# Patient Record
Sex: FEMALE | Race: WHITE | NOT HISPANIC OR LATINO | URBAN - METROPOLITAN AREA
[De-identification: names, ages, dates, MRNs, and addresses within clinical notes are randomized per-mention and may not be internally consistent; named-entity substitution may affect disease eponyms.]

---

## 2023-07-12 ENCOUNTER — PATIENT OUTREACH (OUTPATIENT)
Dept: ADMINISTRATIVE | Facility: HOSPITAL | Age: 53
End: 2023-07-12
Payer: COMMERCIAL

## 2023-07-12 NOTE — PROGRESS NOTES
Health Maintenance Due   Topic Date Due    Hepatitis C Screening  Never done    Cervical Cancer Screening  Never done    Lipid Panel  Never done    HIV Screening  Never done    TETANUS VACCINE  Never done    Mammogram  Never done    Colorectal Cancer Screening  Never done    Shingles Vaccine (1 of 2) Never done    COVID-19 Vaccine (2 - Pfizer series) 09/14/2021     Chart reviewed. Triggered LINKS. Updated Care Everywhere. Pt has new patient appointment upcoming.     Izaiah Will CMA  Population Health Care Coordinator  Primary Care Team

## 2023-07-17 ENCOUNTER — OFFICE VISIT (OUTPATIENT)
Dept: INTERNAL MEDICINE | Facility: CLINIC | Age: 53
End: 2023-07-17
Payer: COMMERCIAL

## 2023-07-17 ENCOUNTER — LAB VISIT (OUTPATIENT)
Dept: LAB | Facility: HOSPITAL | Age: 53
End: 2023-07-17
Attending: INTERNAL MEDICINE
Payer: COMMERCIAL

## 2023-07-17 VITALS
WEIGHT: 191.13 LBS | HEIGHT: 63 IN | SYSTOLIC BLOOD PRESSURE: 124 MMHG | HEART RATE: 77 BPM | OXYGEN SATURATION: 98 % | DIASTOLIC BLOOD PRESSURE: 74 MMHG | BODY MASS INDEX: 33.87 KG/M2

## 2023-07-17 DIAGNOSIS — L30.9 ECZEMA, UNSPECIFIED TYPE: ICD-10-CM

## 2023-07-17 DIAGNOSIS — Z11.4 ENCOUNTER FOR SCREENING FOR HIV: ICD-10-CM

## 2023-07-17 DIAGNOSIS — E11.65 TYPE 2 DIABETES MELLITUS WITH HYPERGLYCEMIA, WITHOUT LONG-TERM CURRENT USE OF INSULIN: ICD-10-CM

## 2023-07-17 DIAGNOSIS — I10 PRIMARY HYPERTENSION: ICD-10-CM

## 2023-07-17 DIAGNOSIS — Z76.89 ENCOUNTER TO ESTABLISH CARE: Primary | ICD-10-CM

## 2023-07-17 DIAGNOSIS — Z12.31 ENCOUNTER FOR SCREENING MAMMOGRAM FOR MALIGNANT NEOPLASM OF BREAST: ICD-10-CM

## 2023-07-17 DIAGNOSIS — Z12.11 SCREENING FOR COLON CANCER: ICD-10-CM

## 2023-07-17 DIAGNOSIS — Z11.59 NEED FOR HEPATITIS C SCREENING TEST: ICD-10-CM

## 2023-07-17 DIAGNOSIS — Z80.0 FAMILY HISTORY OF COLON CANCER IN FATHER: ICD-10-CM

## 2023-07-17 DIAGNOSIS — F41.9 ANXIETY: ICD-10-CM

## 2023-07-17 PROBLEM — E11.9 TYPE 2 DIABETES MELLITUS: Status: ACTIVE | Noted: 2023-07-17

## 2023-07-17 PROBLEM — A63.0 CONDYLOMA ACUMINATUM OF VULVA: Status: ACTIVE | Noted: 2021-11-05

## 2023-07-17 PROBLEM — E78.5 HYPERLIPIDEMIA: Status: ACTIVE | Noted: 2023-07-17

## 2023-07-17 PROBLEM — R87.612 LOW GRADE SQUAMOUS INTRAEPITHELIAL LESION (LGSIL) ON CERVICAL PAP SMEAR: Status: ACTIVE | Noted: 2021-11-05

## 2023-07-17 LAB
ALBUMIN/CREAT UR: NORMAL UG/MG (ref 0–30)
CREAT UR-MCNC: 27 MG/DL (ref 15–325)
MICROALBUMIN UR DL<=1MG/L-MCNC: <5 UG/ML

## 2023-07-17 PROCEDURE — 99386 PREV VISIT NEW AGE 40-64: CPT | Mod: S$GLB,,, | Performed by: INTERNAL MEDICINE

## 2023-07-17 PROCEDURE — 3074F SYST BP LT 130 MM HG: CPT | Mod: CPTII,S$GLB,, | Performed by: INTERNAL MEDICINE

## 2023-07-17 PROCEDURE — 1159F PR MEDICATION LIST DOCUMENTED IN MEDICAL RECORD: ICD-10-PCS | Mod: CPTII,S$GLB,, | Performed by: INTERNAL MEDICINE

## 2023-07-17 PROCEDURE — 3008F BODY MASS INDEX DOCD: CPT | Mod: CPTII,S$GLB,, | Performed by: INTERNAL MEDICINE

## 2023-07-17 PROCEDURE — 4010F PR ACE/ARB THEARPY RXD/TAKEN: ICD-10-PCS | Mod: CPTII,S$GLB,, | Performed by: INTERNAL MEDICINE

## 2023-07-17 PROCEDURE — 82570 ASSAY OF URINE CREATININE: CPT | Performed by: INTERNAL MEDICINE

## 2023-07-17 PROCEDURE — 3078F DIAST BP <80 MM HG: CPT | Mod: CPTII,S$GLB,, | Performed by: INTERNAL MEDICINE

## 2023-07-17 PROCEDURE — 1160F RVW MEDS BY RX/DR IN RCRD: CPT | Mod: CPTII,S$GLB,, | Performed by: INTERNAL MEDICINE

## 2023-07-17 PROCEDURE — 3008F PR BODY MASS INDEX (BMI) DOCUMENTED: ICD-10-PCS | Mod: CPTII,S$GLB,, | Performed by: INTERNAL MEDICINE

## 2023-07-17 PROCEDURE — 99386 PR PREVENTIVE VISIT,NEW,40-64: ICD-10-PCS | Mod: S$GLB,,, | Performed by: INTERNAL MEDICINE

## 2023-07-17 PROCEDURE — 4010F ACE/ARB THERAPY RXD/TAKEN: CPT | Mod: CPTII,S$GLB,, | Performed by: INTERNAL MEDICINE

## 2023-07-17 PROCEDURE — 99999 PR PBB SHADOW E&M-EST. PATIENT-LVL IV: CPT | Mod: PBBFAC,,, | Performed by: INTERNAL MEDICINE

## 2023-07-17 PROCEDURE — 3074F PR MOST RECENT SYSTOLIC BLOOD PRESSURE < 130 MM HG: ICD-10-PCS | Mod: CPTII,S$GLB,, | Performed by: INTERNAL MEDICINE

## 2023-07-17 PROCEDURE — 1160F PR REVIEW ALL MEDS BY PRESCRIBER/CLIN PHARMACIST DOCUMENTED: ICD-10-PCS | Mod: CPTII,S$GLB,, | Performed by: INTERNAL MEDICINE

## 2023-07-17 PROCEDURE — 3078F PR MOST RECENT DIASTOLIC BLOOD PRESSURE < 80 MM HG: ICD-10-PCS | Mod: CPTII,S$GLB,, | Performed by: INTERNAL MEDICINE

## 2023-07-17 PROCEDURE — 1159F MED LIST DOCD IN RCRD: CPT | Mod: CPTII,S$GLB,, | Performed by: INTERNAL MEDICINE

## 2023-07-17 PROCEDURE — 99999 PR PBB SHADOW E&M-EST. PATIENT-LVL IV: ICD-10-PCS | Mod: PBBFAC,,, | Performed by: INTERNAL MEDICINE

## 2023-07-17 RX ORDER — TRIAMCINOLONE ACETONIDE 1 MG/G
CREAM TOPICAL 2 TIMES DAILY
Qty: 28.4 G | Refills: 2 | Status: SHIPPED | OUTPATIENT
Start: 2023-07-17

## 2023-07-17 RX ORDER — CITALOPRAM 10 MG/1
10 TABLET ORAL EVERY MORNING
Qty: 90 TABLET | Refills: 3 | Status: SHIPPED | OUTPATIENT
Start: 2023-07-17

## 2023-07-17 RX ORDER — DAPAGLIFLOZIN 10 MG/1
10 TABLET, FILM COATED ORAL DAILY
Qty: 90 TABLET | Refills: 3 | Status: SHIPPED | OUTPATIENT
Start: 2023-07-17

## 2023-07-17 RX ORDER — DAPAGLIFLOZIN 10 MG/1
10 TABLET, FILM COATED ORAL DAILY
COMMUNITY
End: 2023-07-17 | Stop reason: SDUPTHER

## 2023-07-17 RX ORDER — METOPROLOL SUCCINATE 100 MG/1
100 TABLET, EXTENDED RELEASE ORAL DAILY
COMMUNITY
End: 2023-07-17 | Stop reason: SDUPTHER

## 2023-07-17 RX ORDER — CITALOPRAM 10 MG/1
10 TABLET ORAL EVERY MORNING
COMMUNITY
Start: 2023-06-19 | End: 2023-07-17 | Stop reason: SDUPTHER

## 2023-07-17 RX ORDER — METFORMIN HYDROCHLORIDE 500 MG/1
500 TABLET ORAL 2 TIMES DAILY WITH MEALS
COMMUNITY
End: 2023-07-17 | Stop reason: SDUPTHER

## 2023-07-17 RX ORDER — LOSARTAN POTASSIUM 100 MG/1
100 TABLET ORAL DAILY
COMMUNITY
End: 2023-07-17 | Stop reason: SDUPTHER

## 2023-07-17 RX ORDER — LOSARTAN POTASSIUM 100 MG/1
100 TABLET ORAL DAILY
Qty: 90 TABLET | Refills: 3 | Status: SHIPPED | OUTPATIENT
Start: 2023-07-17

## 2023-07-17 RX ORDER — METFORMIN HYDROCHLORIDE 500 MG/1
TABLET ORAL
Qty: 270 TABLET | Refills: 3 | Status: SHIPPED | OUTPATIENT
Start: 2023-07-17

## 2023-07-17 RX ORDER — CITALOPRAM HYDROBROMIDE 10 MG/5ML
10 SOLUTION ORAL DAILY
COMMUNITY
End: 2023-07-17 | Stop reason: CLARIF

## 2023-07-17 RX ORDER — METOPROLOL SUCCINATE 100 MG/1
100 TABLET, EXTENDED RELEASE ORAL DAILY
Qty: 90 TABLET | Refills: 3 | Status: SHIPPED | OUTPATIENT
Start: 2023-07-17

## 2023-07-17 NOTE — PATIENT INSTRUCTIONS
Schedule fasting labwork  Urine sample today  Schedule mammogram  Schedule optometry appointment for diabetic eye exam.    Schedule appointment with gynecology (Dr. Duffy) at your earliest convenience    Colonoscopy - a colonoscopy has been ordered for you. In order to schedule this appointment, please call [phone].     Return to in 3 months or sooner if needed.

## 2023-07-17 NOTE — PROGRESS NOTES
"Subjective:       Patient ID: Waleska Weir is a 52 y.o. female.    Chief Complaint: Establish Care      HPI  Waleska Weir is a 52 y.o. year old female with HTN, anxiety, abnormal pap with hx of condyloma (follows with gyn Dr. Pritchard), t2dm (reportedly controlled on medications) presents for establishment of care and medication refill. Previous PCP Dr. Watson.    Reportedly was previously on trulicity, was not consistent with taking it, was taken off this medication.     Review of Systems   Constitutional:  Negative for activity change, appetite change, fatigue, fever and unexpected weight change.   HENT:  Negative for congestion, hearing loss, postnasal drip, sneezing, sore throat, trouble swallowing and voice change.    Eyes:  Negative for pain and discharge.   Respiratory:  Negative for cough, choking, chest tightness, shortness of breath and wheezing.    Cardiovascular:  Negative for chest pain, palpitations and leg swelling.   Gastrointestinal:  Negative for abdominal distention, abdominal pain, blood in stool, constipation, diarrhea, nausea and vomiting.   Endocrine: Negative for polydipsia and polyuria.   Genitourinary:  Negative for difficulty urinating and flank pain.   Musculoskeletal:  Negative for arthralgias, back pain, joint swelling, myalgias and neck pain.   Skin:  Negative for rash.   Neurological:  Negative for dizziness, tremors, seizures, weakness, numbness and headaches.   Psychiatric/Behavioral:  Negative for agitation. The patient is not nervous/anxious.        No past medical history on file.     Prior to Admission medications    Not on File        Past medical history, surgical history, and family medical history reviewed and updated as appropriate.    Medications and allergies reviewed.     Objective:          Vitals:    07/17/23 0759   BP: 124/74   BP Location: Right arm   Patient Position: Sitting   Pulse: 77   SpO2: 98%   Weight: 86.7 kg (191 lb 2.2 oz)   Height: 5' 3" (1.6 m) "     Body mass index is 33.86 kg/m².  Physical Exam  Constitutional:       Appearance: She is well-developed.   HENT:      Head: Normocephalic and atraumatic.   Eyes:      Extraocular Movements: Extraocular movements intact.   Cardiovascular:      Rate and Rhythm: Normal rate and regular rhythm.      Heart sounds: Normal heart sounds.   Pulmonary:      Effort: Pulmonary effort is normal. No respiratory distress.      Breath sounds: Normal breath sounds. No wheezing.   Abdominal:      General: Bowel sounds are normal. There is no distension.      Palpations: Abdomen is soft.      Tenderness: There is no abdominal tenderness.   Musculoskeletal:         General: No tenderness. Normal range of motion.      Cervical back: Normal range of motion.   Skin:     General: Skin is warm and dry.   Neurological:      Mental Status: She is alert and oriented to person, place, and time.      Cranial Nerves: No cranial nerve deficit.      Deep Tendon Reflexes: Reflexes are normal and symmetric.       No results found for: WBC, HGB, HCT, PLT, CHOL, TRIG, HDL, LDLDIRECT, ALT, AST, NA, K, CL, CREATININE, BUN, CO2, TSH, PSA, INR, GLUF, HGBA1C, MICROALBUR    Assessment:       1. Encounter to establish care    2. Primary hypertension    3. Type 2 diabetes mellitus with hyperglycemia, without long-term current use of insulin    4. Anxiety    5. Eczema, unspecified type    6. Screening for colon cancer    7. Family history of colon cancer in father    8. Encounter for screening mammogram for malignant neoplasm of breast    9. Encounter for screening for HIV    10. Need for hepatitis C screening test          Plan:     Waleska was seen today for establish care.    Diagnoses and all orders for this visit:    Encounter to establish care    Primary hypertension  Comments:  on losartan 100 mg, metoprolol succinate 100 mg  Orders:  -     CBC Auto Differential; Future  -     Comprehensive Metabolic Panel; Future  -     TSH; Future  -     Lipid  Panel; Future  -     losartan (COZAAR) 100 MG tablet; Take 1 tablet (100 mg total) by mouth once daily.  -     metoprolol succinate (TOPROL-XL) 100 MG 24 hr tablet; Take 1 tablet (100 mg total) by mouth once daily.    Type 2 diabetes mellitus with hyperglycemia, without long-term current use of insulin  Comments:  last a1c unknown, on metformin 500 TID and dapagliflozin 10 mg  Orders:  -     Hemoglobin A1C; Future  -     Lipid Panel; Future  -     Microalbumin/Creatinine Ratio, Urine; Future  -     Ambulatory referral/consult to Optometry; Future  -     dapagliflozin propanediol (FARXIGA) 10 mg tablet; Take 1 tablet (10 mg total) by mouth once daily.  -     metFORMIN (GLUCOPHAGE) 500 MG tablet; Take 2 tablets (1,000 mg total) by mouth daily with breakfast AND 1 tablet (500 mg total) daily with dinner or evening meal.    Anxiety  Comments:  medications reviewed, prescribed citalopram 10 mg daily  Orders:  -     citalopram (CELEXA) 10 MG tablet; Take 1 tablet (10 mg total) by mouth every morning.    Eczema, unspecified type  -     triamcinolone acetonide 0.1% (KENALOG) 0.1 % cream; Apply topically 2 (two) times daily.    Screening for colon cancer  -     Ambulatory referral/consult to Endo Procedure ; Future    Family history of colon cancer in father  -     Ambulatory referral/consult to Endo Procedure ; Future    Encounter for screening mammogram for malignant neoplasm of breast  -     Mammo Digital Screening Bilat; Future    Encounter for screening for HIV  -     HIV 1/2 Ag/Ab (4th Gen); Future    Need for hepatitis C screening test  -     HEPATITIS C ANTIBODY; Future    Benign physical examination, no issues identified. Will obtain routine labwork and age appropriate health screenings.     Health maintenance reviewed with patient.     Follow up in about 3 months (around 10/17/2023).    Yifan Peña MD  Internal Medicine / Primary Care  [facility]  7/17/2023

## 2023-07-18 ENCOUNTER — TELEPHONE (OUTPATIENT)
Dept: ENDOSCOPY | Facility: HOSPITAL | Age: 53
End: 2023-07-18
Payer: COMMERCIAL

## 2023-07-18 NOTE — TELEPHONE ENCOUNTER
Contacted the patient to schedule an endoscopy procedure(s) colonoscopy. The patient did not answer the call and left a voice message requesting a call back.

## 2023-07-25 ENCOUNTER — TELEPHONE (OUTPATIENT)
Dept: ENDOSCOPY | Facility: HOSPITAL | Age: 53
End: 2023-07-25
Payer: COMMERCIAL

## 2023-07-26 ENCOUNTER — TELEPHONE (OUTPATIENT)
Dept: ENDOSCOPY | Facility: HOSPITAL | Age: 53
End: 2023-07-26
Payer: COMMERCIAL

## 2024-02-20 ENCOUNTER — PATIENT OUTREACH (OUTPATIENT)
Dept: ADMINISTRATIVE | Facility: HOSPITAL | Age: 54
End: 2024-02-20
Payer: COMMERCIAL

## 2024-02-20 NOTE — PROGRESS NOTES
Health Maintenance Due   Topic Date Due    Hepatitis C Screening  Never done    Cervical Cancer Screening  Never done    HIV Screening  Never done    Mammogram  Never done    Colorectal Cancer Screening  Never done    Shingles Vaccine (1 of 2) Never done    Influenza Vaccine (1) Never done    COVID-19 Vaccine (2 - 2023-24 season) 09/01/2023      Chart reviewed. Triggered LINKS. Updated Care Everywhere.     Izaiah Will CMA  Population Health Care Coordinator  Primary Care Team

## 2024-03-22 ENCOUNTER — PATIENT OUTREACH (OUTPATIENT)
Dept: ADMINISTRATIVE | Facility: HOSPITAL | Age: 54
End: 2024-03-22
Payer: COMMERCIAL

## 2024-06-24 DIAGNOSIS — I10 PRIMARY HYPERTENSION: ICD-10-CM

## 2024-06-24 DIAGNOSIS — E11.65 TYPE 2 DIABETES MELLITUS WITH HYPERGLYCEMIA, WITHOUT LONG-TERM CURRENT USE OF INSULIN: ICD-10-CM

## 2024-06-24 RX ORDER — LOSARTAN POTASSIUM 100 MG/1
100 TABLET ORAL DAILY
Qty: 90 TABLET | Refills: 3 | Status: SHIPPED | OUTPATIENT
Start: 2024-06-24

## 2024-06-24 RX ORDER — METFORMIN HYDROCHLORIDE 500 MG/1
TABLET ORAL
Qty: 270 TABLET | Refills: 3 | OUTPATIENT
Start: 2024-06-24

## 2024-06-24 RX ORDER — METFORMIN HYDROCHLORIDE 500 MG/1
TABLET ORAL
Qty: 270 TABLET | Refills: 0 | Status: SHIPPED | OUTPATIENT
Start: 2024-06-24

## 2024-06-24 NOTE — TELEPHONE ENCOUNTER
Refill Routing Note   Medication(s) are not appropriate for processing by [facility] for the following reason(s):             [facility] action(s):  Defer  Quick Discontinue   Requires labs : Yes      Medication Therapy Plan: metformin rx request sent to PCP today      Appointments  past 12m or future 3m with PCP    Date Provider   Last Visit   7/17/2023 Yifan Peña MD   Next Visit   6/24/2024 Yifan Peña MD   ED visits in past 90 days: 0        Note composed:1:12 PM 06/24/2024

## 2024-06-24 NOTE — TELEPHONE ENCOUNTER
No care due was identified.  [facility] Embedded Care Due Messages. Reference number: [id number].   6/24/2024 10:57:49 AM CDT

## 2024-06-24 NOTE — TELEPHONE ENCOUNTER
----- Message from Chapin Block sent at 6/24/2024  8:24 AM CDT -----  Regarding: Refill Request  Contact: pt [phone]  Requesting an RX refill or new RX.    Is this a refill or new RX: refill    RX name and strength (copy/paste from chart):  metFORMIN (GLUCOPHAGE) 500 MG tablet    Is this a 30 day or 90 day RX:     Pharmacy name and phone # (copy/paste from chart):    [facility] - [facility] - [address] [place] [address], [facility] [address]  [address] [place] [address] [address] [place] [address]  Phone: [phone] Fax: [phone]

## 2024-06-24 NOTE — TELEPHONE ENCOUNTER
Care Due:                  Date            Visit Type   Department     Provider  --------------------------------------------------------------------------------                                NP -                              PRIMARY      [facility] INTERNAL  Last Visit: 07-      CARE ([facility])   MEDICINE       Yifan Peña  Next Visit: None Scheduled  None         None Found                                                            Last  Test          Frequency    Reason                     Performed    Due Date  --------------------------------------------------------------------------------    CMP.........  12 months..  dapagliflozin, losartan,   Not Found    Overdue                             metFORMIN................    HBA1C.......  6 months...  dapagliflozin, metFORMIN.  Not Found    Overdue    [employer] Embedded Care Due Messages. Reference number: [id number].   6/24/2024 10:27:57 AM CDT

## 2024-06-24 NOTE — TELEPHONE ENCOUNTER
Refill Routing Note   Medication(s) are not appropriate for processing by [facility] for the following reason(s):        Required labs outdated    [facility] action(s):  Defer               Appointments  past 12m or future 3m with PCP    Date Provider   Last Visit   7/17/2023 Yifan Peña MD   Next Visit   6/28/2024 Yifan Peña MD   ED visits in past 90 days: 0        Note composed:11:01 AM 06/24/2024

## 2024-06-25 ENCOUNTER — TELEPHONE (OUTPATIENT)
Dept: INTERNAL MEDICINE | Facility: CLINIC | Age: 54
End: 2024-06-25
Payer: COMMERCIAL

## 2024-06-25 NOTE — TELEPHONE ENCOUNTER
----- Message from Erica Wilcox sent at 6/24/2024  1:24 PM CDT -----  Contact: Pt [phone]  Would like to receive medical advice.    Would they like a call back or a response via [facility]:  Call Back  Additional information:      The pt is calling to discuss why her metFORMIN (GLUCOPHAGE) 500 MG tablet was denied when she requested a refill. It would have been for just enough to last her till the appt on Friday.

## 2024-07-23 DIAGNOSIS — I10 PRIMARY HYPERTENSION: ICD-10-CM

## 2024-07-23 DIAGNOSIS — E11.65 TYPE 2 DIABETES MELLITUS WITH HYPERGLYCEMIA, WITHOUT LONG-TERM CURRENT USE OF INSULIN: ICD-10-CM

## 2024-07-23 DIAGNOSIS — F41.9 ANXIETY: ICD-10-CM

## 2024-07-23 RX ORDER — CITALOPRAM 10 MG/1
10 TABLET ORAL EVERY MORNING
Qty: 90 TABLET | Refills: 0 | Status: SHIPPED | OUTPATIENT
Start: 2024-07-23

## 2024-07-23 NOTE — TELEPHONE ENCOUNTER
Care Due:                  Date            Visit Type   Department     Provider  --------------------------------------------------------------------------------                                NP -                              PRIMARY      [facility] INTERNAL  Last Visit: 07-      [facility] ([facility])   MEDICINE       Yifan Peña  Next Visit: None Scheduled  None         None Found                                                            Last  Test          Frequency    Reason                     Performed    Due Date  --------------------------------------------------------------------------------    Office Visit  15 months..  citalopram,                07-   10-                             dapagliflozin, losartan,                             metFORMIN, metoprolol....    [employer] Embedded Care Due Messages. Reference number: [id number].   7/23/2024 10:28:35 AM CDT

## 2024-07-23 NOTE — TELEPHONE ENCOUNTER
Refill Routing Note   Medication(s) are not appropriate for processing by [facility] for the following reason(s):        Required labs outdated  Required vitals outdated    [facility] action(s):  Defer  Approve   Requires appointment : Yes             Appointments  past 12m or future 3m with PCP    Date Provider   Last Visit   7/17/2023 Yifan Peña MD   Next Visit   Visit date not found Yifan Peña MD   ED visits in past 90 days: 0        Note composed:4:22 PM 07/23/2024

## 2024-07-24 RX ORDER — METOPROLOL SUCCINATE 100 MG/1
100 TABLET, EXTENDED RELEASE ORAL DAILY
Qty: 90 TABLET | Refills: 0 | Status: SHIPPED | OUTPATIENT
Start: 2024-07-24

## 2024-07-24 RX ORDER — DAPAGLIFLOZIN 10 MG/1
10 TABLET, FILM COATED ORAL DAILY
Qty: 90 TABLET | Refills: 0 | Status: SHIPPED | OUTPATIENT
Start: 2024-07-24

## 2024-08-07 ENCOUNTER — PATIENT OUTREACH (OUTPATIENT)
Dept: ADMINISTRATIVE | Facility: HOSPITAL | Age: 54
End: 2024-08-07
Payer: COMMERCIAL

## 2024-09-19 ENCOUNTER — OFFICE VISIT (OUTPATIENT)
Dept: INTERNAL MEDICINE | Facility: CLINIC | Age: 54
End: 2024-09-19
Payer: COMMERCIAL

## 2024-09-19 ENCOUNTER — LAB VISIT (OUTPATIENT)
Dept: LAB | Facility: HOSPITAL | Age: 54
End: 2024-09-19
Payer: COMMERCIAL

## 2024-09-19 VITALS
DIASTOLIC BLOOD PRESSURE: 70 MMHG | WEIGHT: 189.81 LBS | SYSTOLIC BLOOD PRESSURE: 130 MMHG | HEART RATE: 86 BPM | BODY MASS INDEX: 33.63 KG/M2 | OXYGEN SATURATION: 95 % | HEIGHT: 63 IN

## 2024-09-19 DIAGNOSIS — E11.69 TYPE 2 DIABETES MELLITUS WITH OTHER SPECIFIED COMPLICATION, WITHOUT LONG-TERM CURRENT USE OF INSULIN: ICD-10-CM

## 2024-09-19 DIAGNOSIS — I10 HYPERTENSION, UNSPECIFIED TYPE: ICD-10-CM

## 2024-09-19 DIAGNOSIS — E11.65 TYPE 2 DIABETES MELLITUS WITH HYPERGLYCEMIA, WITHOUT LONG-TERM CURRENT USE OF INSULIN: ICD-10-CM

## 2024-09-19 DIAGNOSIS — Z12.4 CERVICAL CANCER SCREENING: ICD-10-CM

## 2024-09-19 DIAGNOSIS — R06.2 WHEEZING: ICD-10-CM

## 2024-09-19 DIAGNOSIS — R21 RASH: ICD-10-CM

## 2024-09-19 DIAGNOSIS — Z00.00 ANNUAL PHYSICAL EXAM: ICD-10-CM

## 2024-09-19 DIAGNOSIS — Z12.31 ENCOUNTER FOR SCREENING MAMMOGRAM FOR BREAST CANCER: ICD-10-CM

## 2024-09-19 DIAGNOSIS — E78.5 HYPERLIPIDEMIA, UNSPECIFIED HYPERLIPIDEMIA TYPE: ICD-10-CM

## 2024-09-19 DIAGNOSIS — Z00.00 ANNUAL PHYSICAL EXAM: Primary | ICD-10-CM

## 2024-09-19 DIAGNOSIS — Z12.11 SCREENING FOR COLON CANCER: ICD-10-CM

## 2024-09-19 LAB
ALBUMIN/CREAT UR: NORMAL UG/MG (ref 0–30)
CREAT UR-MCNC: 41 MG/DL (ref 15–325)
MICROALBUMIN UR DL<=1MG/L-MCNC: <5 UG/ML

## 2024-09-19 PROCEDURE — 3066F NEPHROPATHY DOC TX: CPT | Mod: CPTII,S$GLB,, | Performed by: NURSE PRACTITIONER

## 2024-09-19 PROCEDURE — 1159F MED LIST DOCD IN RCRD: CPT | Mod: CPTII,S$GLB,, | Performed by: NURSE PRACTITIONER

## 2024-09-19 PROCEDURE — 3061F NEG MICROALBUMINURIA REV: CPT | Mod: CPTII,S$GLB,, | Performed by: NURSE PRACTITIONER

## 2024-09-19 PROCEDURE — 99999 PR PBB SHADOW E&M-EST. PATIENT-LVL IV: CPT | Mod: PBBFAC,,, | Performed by: NURSE PRACTITIONER

## 2024-09-19 PROCEDURE — 99396 PREV VISIT EST AGE 40-64: CPT | Mod: S$GLB,,, | Performed by: NURSE PRACTITIONER

## 2024-09-19 PROCEDURE — 3078F DIAST BP <80 MM HG: CPT | Mod: CPTII,S$GLB,, | Performed by: NURSE PRACTITIONER

## 2024-09-19 PROCEDURE — 3008F BODY MASS INDEX DOCD: CPT | Mod: CPTII,S$GLB,, | Performed by: NURSE PRACTITIONER

## 2024-09-19 PROCEDURE — 4010F ACE/ARB THERAPY RXD/TAKEN: CPT | Mod: CPTII,S$GLB,, | Performed by: NURSE PRACTITIONER

## 2024-09-19 PROCEDURE — 82570 ASSAY OF URINE CREATININE: CPT | Performed by: NURSE PRACTITIONER

## 2024-09-19 PROCEDURE — 3046F HEMOGLOBIN A1C LEVEL >9.0%: CPT | Mod: CPTII,S$GLB,, | Performed by: NURSE PRACTITIONER

## 2024-09-19 PROCEDURE — 3075F SYST BP GE 130 - 139MM HG: CPT | Mod: CPTII,S$GLB,, | Performed by: NURSE PRACTITIONER

## 2024-09-19 PROCEDURE — 1160F RVW MEDS BY RX/DR IN RCRD: CPT | Mod: CPTII,S$GLB,, | Performed by: NURSE PRACTITIONER

## 2024-09-19 PROCEDURE — 82043 UR ALBUMIN QUANTITATIVE: CPT | Performed by: NURSE PRACTITIONER

## 2024-09-19 RX ORDER — METFORMIN HYDROCHLORIDE 500 MG/1
TABLET ORAL
Qty: 90 TABLET | Refills: 0 | Status: SHIPPED | OUTPATIENT
Start: 2024-09-19 | End: 2024-09-20

## 2024-09-19 RX ORDER — HYDROCORTISONE 25 MG/G
CREAM TOPICAL 2 TIMES DAILY PRN
Qty: 28 G | Refills: 0 | Status: SHIPPED | OUTPATIENT
Start: 2024-09-19 | End: 2024-09-29

## 2024-09-19 NOTE — PROGRESS NOTES
INTERNAL MEDICINE PROGRESS/URGENT CARE NOTE    CHIEF COMPLAINT     Chief Complaint   Patient presents with    Annual Exam       HPI     Waleska Weir is a 54 y.o. female who presents for a follow up visit today.    Has been lost to follow up for over a year.  Did not get her labs done that PCP ordered.    She has a history of diabetes.  She is on metformin a 1000 mg in the morning and 500 mg at night.  She is also on 10 mg of Farxiga.  Sometimes checks her blood sugars which ranged between 150 and 170 fasting in the morning.  Needs an eye exam. Does have some dietary indiscretions.     History of hypertension.  Well-controlled on losartan 100 mg and metoprolol 100 mg.    Has been on Celexa 10 mg for her mood and tolerates this well.    She is overdue for all of her preventative health screens.  She has never had a colonoscopy.  She is amenable to do one.     Her only complaint is that she has had this intermittent red itchy rash to her face for the past week.  Has tried antihistamines.  No other lesions.  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  She does not smoke.    Patient Active Problem List   Diagnosis    Condyloma acuminatum of vulva    Hyperlipidemia    Hypertension    Low grade squamous intraepithelial lesion (LGSIL) on cervical Pap smear    Type 2 diabetes mellitus        Past Medical History:  No past medical history on file.     Past Surgical History:  No past surgical history on file.     Allergies:  Review of patient's allergies indicates:  No Known Allergies    Home Medications:    Current Outpatient Medications:     citalopram (CELEXA) 10 MG tablet, Take 1 tablet (10 mg total) by mouth every morning., Disp: 90 tablet, Rfl: 0    FARXIGA 10 mg tablet, Take 1 tablet (10 mg total) by mouth once daily., Disp: 90 tablet, Rfl: 0    losartan (COZAAR) 100 MG tablet, Take 1 tablet (100 mg total) by mouth once daily., Disp: 90 tablet, Rfl: 3     "metoprolol succinate (TOPROL-XL) 100 MG 24 hr tablet, Take 1 tablet (100 mg total) by mouth once daily., Disp: 90 tablet, Rfl: 0    triamcinolone acetonide 0.1% (KENALOG) 0.1 % cream, Apply topically 2 (two) times daily., Disp: 28.4 g, Rfl: 2    hydrocortisone 2.5 % cream, Apply topically 2 (two) times daily as needed (rash)., Disp: 28 g, Rfl: 0    metFORMIN (GLUCOPHAGE-XR) 500 MG ER 24hr tablet, Take 2 tablets (1,000 mg total) by mouth 2 (two) times daily with meals., Disp: 360 tablet, Rfl: 0    semaglutide (OZEMPIC) 0.25 mg or 0.5 mg (2 mg/3 mL) pen injector, Inject 0.25 mg into the skin every 7 days for 30 days, THEN 0.5 mg every 7 days., Disp: 9 mL, Rfl: 0     Review of Systems:  Review of Systems   Constitutional:  Negative for fatigue and fever.   HENT:  Negative for congestion and sore throat.    Respiratory:  Negative for cough, chest tightness, shortness of breath and wheezing.    Cardiovascular:  Negative for chest pain.   Gastrointestinal:  Negative for abdominal pain, constipation, diarrhea, nausea and vomiting.   Genitourinary:  Negative for dysuria and urgency.   Musculoskeletal:  Negative for arthralgias.   Neurological:  Negative for weakness and headaches.         PHYSICAL EXAM     Vitals:    09/19/24 1555   BP: 130/70   Pulse: 86   SpO2: 95%   Weight: 86.1 kg (189 lb 13.1 oz)   Height: 5' 3" (1.6 m)      Body mass index is 33.62 kg/m².     Physical Exam  Vitals reviewed.   Constitutional:       Appearance: Normal appearance.   HENT:      Head: Normocephalic.      Right Ear: Tympanic membrane normal.      Left Ear: Tympanic membrane normal.      Mouth/Throat:      Mouth: Mucous membranes are moist.      Pharynx: Oropharynx is clear.   Eyes:      Conjunctiva/sclera: Conjunctivae normal.      Pupils: Pupils are equal, round, and reactive to light.   Cardiovascular:      Rate and Rhythm: Normal rate and regular rhythm.   Pulmonary:      Effort: Pulmonary effort is normal.      Breath sounds: No " wheezing or rhonchi.      Comments: Has mild expiratory wheezes in bases  Abdominal:      General: Bowel sounds are normal.      Palpations: Abdomen is soft.      Tenderness: There is no abdominal tenderness.   Musculoskeletal:      Right lower leg: No edema.      Left lower leg: No edema.   Lymphadenopathy:      Cervical: No cervical adenopathy.   Skin:     General: Skin is warm and dry.      Comments: Erythema noted to neck and jaw line   Neurological:      General: No focal deficit present.      Mental Status: She is alert.   Psychiatric:         Mood and Affect: Mood normal.         Behavior: Behavior normal.         LABS     Lab Results   Component Value Date    HGBA1C 10.7 (H) 09/19/2024     CMP  Sodium   Date Value Ref Range Status   09/19/2024 134 (L) 136 - 145 mmol/L Final     Potassium   Date Value Ref Range Status   09/19/2024 3.7 3.5 - 5.1 mmol/L Final     Chloride   Date Value Ref Range Status   09/19/2024 102 95 - 110 mmol/L Final     CO2   Date Value Ref Range Status   09/19/2024 18 (L) 23 - 29 mmol/L Final     Glucose   Date Value Ref Range Status   09/19/2024 226 (H) 70 - 110 mg/dL Final     BUN   Date Value Ref Range Status   09/19/2024 15 6 - 20 mg/dL Final     Creatinine   Date Value Ref Range Status   09/19/2024 0.7 0.5 - 1.4 mg/dL Final     Calcium   Date Value Ref Range Status   09/19/2024 9.9 8.7 - 10.5 mg/dL Final     Total Protein   Date Value Ref Range Status   09/19/2024 7.1 6.0 - 8.4 g/dL Final     Albumin   Date Value Ref Range Status   09/19/2024 3.7 3.5 - 5.2 g/dL Final     Total Bilirubin   Date Value Ref Range Status   09/19/2024 0.3 0.1 - 1.0 mg/dL Final     Comment:     For infants and newborns, interpretation of results should be based  on gestational age, weight and in agreement with clinical  observations.    Premature Infant recommended reference ranges:  Up to 24 hours.............<8.0 mg/dL  Up to 48 hours............<12.0 mg/dL  3-5 days..................<15.0 mg/dL  6-29  "days.................<15.0 mg/dL       Alkaline Phosphatase   Date Value Ref Range Status   09/19/2024 101 55 - 135 U/L Final     AST   Date Value Ref Range Status   09/19/2024 18 10 - 40 U/L Final     ALT   Date Value Ref Range Status   09/19/2024 13 10 - 44 U/L Final     Anion Gap   Date Value Ref Range Status   09/19/2024 14 8 - 16 mmol/L Final     Lab Results   Component Value Date    WBC 7.67 09/19/2024    HGB 13.9 09/19/2024    HCT 43.5 09/19/2024    MCV 87 09/19/2024     09/19/2024     No results found for: "CHOL"  No results found for: "HDL"  No results found for: "LDLCALC"  No results found for: "TRIG"  No results found for: "CHOLHDL"  Lab Results   Component Value Date    TSH 0.695 09/19/2024       ASSESSMENT     1. Annual physical exam    2. Type 2 diabetes mellitus with other specified complication, without long-term current use of insulin    3. Hypertension, unspecified type    4. Hyperlipidemia, unspecified hyperlipidemia type    5. Screening for colon cancer    6. Encounter for screening mammogram for breast cancer    7. Type 2 diabetes mellitus with hyperglycemia, without long-term current use of insulin    8. Cervical cancer screening    9. Type 2 diabetes mellitus with hyperglycemia, without long-term current use of insulin    10. Wheezing    11. Rash           PLAN  Discussed age and gender appropriate screenings at this visit and encouraged a healthy diet low in simple carbohydrates, and increased physical activity.  Counseled on medically appropriate vaccines based on age and current health condition.  Screening test reviewed and discussed with patient.   Check labs today and she not fasting she will come back when she is fasting to check lipid panel.  Continue the metformin and Farxiga for now.  She is very interested in starting Ozempic to help with her diabetes and her weight loss.  Discussed that I would need to get labs 1st.  She denies any family history of medullary thyroid cancer " or multiple endocrine neoplasia.  No history of pancreatitis.  We discussed these in detail about how to administer and side effects.  Hopefully insurance will cover  Her blood pressure stable continue current medicines  Referral placed to endo , mammogram, CEDRICK overdue for Pap  She is wheezing on exam.  She is completely asymptomatic.  She does have an albuterol inhaler for when she had bronchitis a few months ago.  States she uses that only when needed.  She is a nonsmoker.  No history of asthma.  We will check a chest x-ray and PFTs  We will send a low-dose steroid cream for rash  Follow up in 1 month      1. Annual physical exam  - CBC Auto Differential; Future  - Comprehensive Metabolic Panel; Future  - Lipid Panel; Future  - TSH; Future  - Hemoglobin A1C; Future  - Microalbumin/Creatinine Ratio, Urine; Future  - HEPATITIS C ANTIBODY; Future    2. Type 2 diabetes mellitus with other specified complication, without long-term current use of insulin  - CBC Auto Differential; Future  - Comprehensive Metabolic Panel; Future  - Lipid Panel; Future  - TSH; Future  - Hemoglobin A1C; Future  - Microalbumin/Creatinine Ratio, Urine; Future  - HEPATITIS C ANTIBODY; Future    3. Hypertension, unspecified type  - CBC Auto Differential; Future  - Comprehensive Metabolic Panel; Future  - Lipid Panel; Future  - TSH; Future  - Hemoglobin A1C; Future  - Microalbumin/Creatinine Ratio, Urine; Future  - HEPATITIS C ANTIBODY; Future    4. Hyperlipidemia, unspecified hyperlipidemia type  - CBC Auto Differential; Future  - Comprehensive Metabolic Panel; Future  - Lipid Panel; Future  - TSH; Future  - Hemoglobin A1C; Future  - Microalbumin/Creatinine Ratio, Urine; Future  - HEPATITIS C ANTIBODY; Future    5. Screening for colon cancer  - Ambulatory referral/consult to Endo Procedure ; Future    6. Encounter for screening mammogram for breast cancer  - Mammo Digital Screening Bilat w/ [facility]; Future    7. Type 2 diabetes  mellitus with hyperglycemia, without long-term current use of insulin    8. Cervical cancer screening  - Ambulatory referral/consult to Obstetrics / Gynecology; Future    9. Type 2 diabetes mellitus with hyperglycemia, without long-term current use of insulin    10. Wheezing  - Complete PFT with bronchodilator; Future    11. Rash  - hydrocortisone 2.5 % cream; Apply topically 2 (two) times daily as needed (rash).  Dispense: 28 g; Refill: 0       Follow up in 1 month    Patient was counseled on when to seek emergent care. Patient's plan/treatment was discussed including medications and possible side effects. Verbalized understanding of all instructions.     This note was partly generated with [employer] voice recognition software. I apologize for any possible typographical errors.          ERNIE Bazan  Department of Internal Medicine - [facility]  09/20/2024

## 2024-09-20 ENCOUNTER — TELEPHONE (OUTPATIENT)
Dept: INTERNAL MEDICINE | Facility: CLINIC | Age: 54
End: 2024-09-20
Payer: COMMERCIAL

## 2024-09-20 DIAGNOSIS — R06.2 WHEEZING: ICD-10-CM

## 2024-09-20 DIAGNOSIS — E11.65 TYPE 2 DIABETES MELLITUS WITH HYPERGLYCEMIA, WITHOUT LONG-TERM CURRENT USE OF INSULIN: ICD-10-CM

## 2024-09-20 DIAGNOSIS — E11.69 TYPE 2 DIABETES MELLITUS WITH OTHER SPECIFIED COMPLICATION, WITHOUT LONG-TERM CURRENT USE OF INSULIN: Primary | ICD-10-CM

## 2024-09-20 DIAGNOSIS — I10 PRIMARY HYPERTENSION: ICD-10-CM

## 2024-09-20 DIAGNOSIS — F41.9 ANXIETY: ICD-10-CM

## 2024-09-20 RX ORDER — METFORMIN HYDROCHLORIDE 500 MG/1
1000 TABLET, EXTENDED RELEASE ORAL 2 TIMES DAILY WITH MEALS
Qty: 360 TABLET | Refills: 0 | Status: SHIPPED | OUTPATIENT
Start: 2024-09-20 | End: 2025-09-20

## 2024-09-20 RX ORDER — SEMAGLUTIDE 0.68 MG/ML
INJECTION, SOLUTION SUBCUTANEOUS
Qty: 9 ML | Refills: 0 | Status: SHIPPED | OUTPATIENT
Start: 2024-09-20 | End: 2024-12-19

## 2024-09-20 NOTE — TELEPHONE ENCOUNTER
Spoke with patient.  Her diabetes is not controlled.  Her A1c is over 10.  She does admit to eating poorly over the past few months.  We will increase her metformin to a 1000 twice a day and we will add on Ozempic as we discussed during our visit.  Discussed dosing and how to administer.  Rest of her labs look fine so far.  She is coming in Monday for a lipid panel and we will check a chest x-ray prior to PFTs.   Will have her follow up with me in 1 month.     Verline, can you schedule this cxr and UA here for Monday? She is coming in for lipid panel around 8:30 here.

## 2024-09-20 NOTE — TELEPHONE ENCOUNTER
No care due was identified.  [facility] Embedded Care Due Messages. Reference number: [id number].   9/20/2024 3:33:36 PM CDT

## 2024-09-21 RX ORDER — CITALOPRAM 10 MG/1
10 TABLET ORAL EVERY MORNING
Qty: 90 TABLET | Refills: 0 | Status: SHIPPED | OUTPATIENT
Start: 2024-09-21

## 2024-09-21 NOTE — TELEPHONE ENCOUNTER
Refill Routing Note   Medication(s) are not appropriate for processing by [facility] for the following reason(s):        No active prescription written by provider    [facility] action(s):  Defer  Approve               Appointments  past 12m or future 3m with PCP    Date Provider   Last Visit   Visit date not found Yifan Peña MD   Next Visit   Visit date not found Yifan Peña MD   ED visits in past 90 days: 0        Note composed:9:41 AM 09/21/2024

## 2024-09-23 ENCOUNTER — TELEPHONE (OUTPATIENT)
Dept: INTERNAL MEDICINE | Facility: CLINIC | Age: 54
End: 2024-09-23
Payer: COMMERCIAL

## 2024-09-23 ENCOUNTER — HOSPITAL ENCOUNTER (OUTPATIENT)
Dept: RADIOLOGY | Facility: HOSPITAL | Age: 54
Discharge: HOME | End: 2024-09-23
Attending: NURSE PRACTITIONER
Payer: COMMERCIAL

## 2024-09-23 DIAGNOSIS — R06.2 WHEEZING: ICD-10-CM

## 2024-09-23 PROCEDURE — 71046 X-RAY EXAM CHEST 2 VIEWS: CPT | Mod: 26,,, | Performed by: RADIOLOGY

## 2024-09-23 PROCEDURE — 71046 X-RAY EXAM CHEST 2 VIEWS: CPT | Mod: TC

## 2024-09-23 NOTE — TELEPHONE ENCOUNTER
----- Message from Ginny Jimenez NP sent at 9/23/2024  9:21 AM CDT -----  Please let pt know chest xray looks fine thankfully.

## 2024-09-24 ENCOUNTER — TELEPHONE (OUTPATIENT)
Dept: INTERNAL MEDICINE | Facility: CLINIC | Age: 54
End: 2024-09-24
Payer: COMMERCIAL

## 2024-09-24 DIAGNOSIS — E78.5 HYPERLIPIDEMIA, UNSPECIFIED HYPERLIPIDEMIA TYPE: Primary | ICD-10-CM

## 2024-09-24 DIAGNOSIS — N89.8 VAGINAL ITCHING: ICD-10-CM

## 2024-09-24 DIAGNOSIS — R21 RASH: ICD-10-CM

## 2024-09-24 RX ORDER — METOPROLOL SUCCINATE 100 MG/1
100 TABLET, EXTENDED RELEASE ORAL DAILY
Qty: 90 TABLET | Refills: 0 | Status: SHIPPED | OUTPATIENT
Start: 2024-09-24

## 2024-09-24 RX ORDER — DAPAGLIFLOZIN 10 MG/1
10 TABLET, FILM COATED ORAL DAILY
Qty: 90 TABLET | Refills: 0 | Status: SHIPPED | OUTPATIENT
Start: 2024-09-24

## 2024-09-24 NOTE — TELEPHONE ENCOUNTER
Spoke with patient she has not received the Ozempic. Waiting on PA. Rash on her face is worse and having some vaginal itching. Okay with taking Cholesterol medication.

## 2024-09-24 NOTE — TELEPHONE ENCOUNTER
----- Message from Shellie Benavides sent at 9/24/2024  3:20 PM CDT -----  Contact: Mobile  [phone]  Patient is returning a phone call.    Who left a message for the patient:     Does patient know what this is regarding:      Would you like a call back, or a response through your [facility] portal?:   Call    Comments: Patient received a call on today.

## 2024-09-25 ENCOUNTER — TELEPHONE (OUTPATIENT)
Dept: INTERNAL MEDICINE | Facility: CLINIC | Age: 54
End: 2024-09-25
Payer: COMMERCIAL

## 2024-09-25 RX ORDER — ATORVASTATIN CALCIUM 10 MG/1
10 TABLET, FILM COATED ORAL DAILY
Qty: 90 TABLET | Refills: 1 | Status: SHIPPED | OUTPATIENT
Start: 2024-09-25 | End: 2025-09-25

## 2024-09-25 RX ORDER — TERCONAZOLE 4 MG/G
1 CREAM VAGINAL NIGHTLY
Qty: 45 G | Refills: 0 | Status: SHIPPED | OUTPATIENT
Start: 2024-09-25 | End: 2024-10-02

## 2024-09-25 NOTE — TELEPHONE ENCOUNTER
----- Message from Noemi Platt sent at 9/25/2024  8:06 AM CDT -----  Contact: [phone]@patient  Patient is returning a phone call.yes    Who left a message for the patient: Ginny Jimenez, NP    Does patient know what this is regarding:      Would you like a call back, or a response through your [facility] portal?:   call back    Comments:

## 2024-09-25 NOTE — TELEPHONE ENCOUNTER
Patient has been taking Zyrtec. States rash looks better but is open to seeing Derm.Picked up cholesterol and yeast medications. Has not received Ozempic. Pharmacy told her they were waiting on PA.

## 2024-09-25 NOTE — TELEPHONE ENCOUNTER
I tried to call pt again. No answer.   I called her in some vaginal cream to treat for possible yeast infection. She will to see the gyn if this doesn't improve.     I sent her in lipitor (atorvastatin) to [facility]    I sent the ozempic to the pharmacy here so that they would take care of the PA. Is there any way for me to check on this?     Has she taken benadryl or zyrtec for rash? I will put in referral to derm

## 2024-09-30 ENCOUNTER — TELEPHONE (OUTPATIENT)
Dept: ENDOSCOPY | Facility: HOSPITAL | Age: 54
End: 2024-09-30

## 2024-09-30 ENCOUNTER — CLINICAL SUPPORT (OUTPATIENT)
Dept: ENDOSCOPY | Facility: HOSPITAL | Age: 54
End: 2024-09-30
Attending: NURSE PRACTITIONER
Payer: COMMERCIAL

## 2024-09-30 DIAGNOSIS — Z12.11 SPECIAL SCREENING FOR MALIGNANT NEOPLASMS, COLON: Primary | ICD-10-CM

## 2024-09-30 DIAGNOSIS — Z12.11 SCREENING FOR COLON CANCER: ICD-10-CM

## 2024-09-30 NOTE — TELEPHONE ENCOUNTER
Spoke to patient to schedule procedure(s) Colonoscopy       Physician to perform procedure(s) Dr. MANISH Heredia  Date of Procedure (s) 12/9/24  Arrival Time 10:00 AM  Time of Procedure(s) 11:00 AM   Location of Procedure(s) [facility] 4th Floor  Type of Rx Prep sent to patient: PEG  Instructions provided to patient via Email    Patient was informed on the following information and verbalized understanding. Screening questionnaire reviewed with patient and complete. If procedure requires anesthesia, a responsible adult needs to be present to accompany the patient home, patient cannot drive after receiving anesthesia. Appointment details are tentative, especially check-in time. Patient will receive a prep-op call 7 days prior to confirm check-in time for procedure. If applicable the patient should contact their pharmacy to verify Rx for procedure prep is ready for pick-up. Patient was advised to call the scheduling department at [phone] if pharmacy states no Rx is available. Patient was advised to call the endoscopy scheduling department if any questions or concerns arise.      [facility] Scheduling Department

## 2024-09-30 NOTE — TELEPHONE ENCOUNTER
Colonoscopy Procedure Prep Instructions    Date of procedure: 12/9/24 Arrive at: 10:00 AM    Location of Department:   [facility] [address] [place] [address]  Take the [facility] Elevators to 4th Floor Endoscopy Lab    As soon as possible:   your prep from pharmacy and over the counter DULCOLAX LAXATIVE TABLETS            On the day before your procedure   What You CAN do:   You may have clear liquids ONLY-see below for list.     Liquids That Are OK to Drink:   Water  Sports drinks (Gatorade, Power-Aid)  Coffee or tea (no cream or nondairy creamer)  Clear juices without pulp (apple, white grape)  Gelatin desserts (no fruit or toppings)  Clear soda (sprite, coke, ginger ale)  Chicken broth (until 12 midnight the night before procedure)    What You CANNOT do:   Do not EAT solid food, drink milk or anything   colored red.  Do not drink alcohol.  Do not take oral medications within 1 hour of starting   each dose of prep.  No gum chewing or candy morning of procedure                       Note:   (Please disregard the insert instructions from pharmacy).  PEG Bowel Prep is indicated for cleansing of the colon as a preparation for colonoscopy in adults.   Be sure to tell your doctor about all the medicines you take, including prescription and non-prescription medicines, vitamins, and herbal supplements. PEG Bowel Prep may affect how other medicines work.  Medication taken by mouth may not be absorbed properly when taken within 1 hour before the start of each dose of PEG Bowel Prep.    It is not uncommon to experience some abdominal cramping, nausea and/or vomiting when taking the prep. If you have nausea and/or vomiting while taking the prep, stop drinking for 20 to 30 minutes then continue.      How to take prep:    PEG Bowel Prep is a (2-day) prep.    One (1) bottle of prep are required for a complete preparation for colonoscopy. Dilute the solution concentrate as directed prior to  use. You must drink water with each dose of prep, and additional water after each dose.    DOSE 1--Day Before Colonoscopy 12/8/24     Drink at least 6 to 8 glasses of clear liquids from time you wake up until you begin your prep and then continue until bedtime to avoid dehydration.     12:00 pm (NOON) Mix your entire container of prep with lukewarm water and refrigerate. Take four (4) Dulcolax (Bisacodyl) tablets with at least 8 ounces or more of clear liquids.       6:00 pm:    You must complete Steps 1 and 2 below before going to bed:    Step 1-Drink half the liquid in the container within one (1) hour.   Step 2-Refrigerate the remaining half of the liquid for dose 2. See below when to begin this step.                       IMPORTANT: If you experience preparation-related symptoms (for example, nausea, bloating, or cramping), stop, or slow the rate of drinking the additional water until your symptoms decrease.    DOSE 2--Day of the Colonoscopy 12/9/24 at 2-3 AM.    For this dose, repeat Step 1 shown above using the remaining half of the liquid prep.   You may continue drinking water/clear liquids until   4 hours before your colonoscopy or as directed by the scheduling nurse  7:00 AM.      For information about your procedure, two (2) things to view prior to colonoscopy:  Please watch this informational video. It is important to watch this animated consent video prior to your arrival. If you haven't watched the video prior to arriving, you are required to watch it during admission which can causes delays.    Options for viewing:   Using a keyboard:  press and hold the control tab (Ctrl) and left mouse click to follow links.           Colonoscopy Instructional Video                                                                                   OR    Type link address into your web browser's address bar:  [email or web address]/[email or web address]      Educational Booklet with pictures:      Colonoscopy Prep -  Liquid      Comments:               IMPORTANT INFORMATION TO KNOW BEFORE YOUR PROCEDURE    [facility] 4th Floor         If your procedure requires the administration of anesthesia, it is necessary for a responsible adult to drive you home. (Medical Transportation, Uber, Lyft, Taxi, etc. may ONLY be used if a responsible adult is present to accompany you home.  The responsible adult CAN'T be the  of the service).      person must be available to return to pick you up within 15 minutes of being notified of discharge.       Please bring a picture ID, insurance card, & copayment      Take Medications as directed below:    If you are taking any injectable medication (s) for weight loss and/or diabetes  weekly, hold for 8 days prior to your scheduled procedure, please stop taking Ozempic (Semaglutide)}on 11/30/24. After the procedure, your provider will inform you  of when to resume injection.      If you begin taking any blood thinning medications, injectable weight loss/diabetes medications (other than insulin) , or Adipex (Phentermine) please contact the endoscopy scheduling department listed below as soon as possible.    If you are diabetic see the attached instruction sheet regarding your medication.     If you take HEART, BLOOD PRESSURE, SEIZURE, PAIN, LUNG (including inhalers/nebulizers), ANTI-REJECTION (transplant patients), or PSYCHIATRIC medications, please take at your regular times with a sip of water or as directed by the scheduling nurse.     Important contact information:    Endoscopy [phone] Hours of operation Monday-Friday 8:00-4:30pm.    Questions about insurance or financial obligations call [phone] or [phone].    If you have questions regarding the prep or need to reschedule, please call [phone]. After hours questions requiring immediate assistance, contact [facility] nurse line at [phone] or [phone].   NOTE:      On occasion, unforeseen circumstances may cause a delay in your procedure start time. We respect your time and appreciate your patience during these circumstances.      Comments:          Are you ready for your Colonoscopy?      __ If you take blood thinners,weight loss or diabetic injectable medications, have you stopped taking them according to your doctor's instructions before your procedures?  __ Have you stopped eating solid foods and followed a clear liquid diet a full day before your procedure or followed the diet       guidelines indicated in your instructions?       REMINDER: NO BROTH AFTER MIDNIGHT THE DAY BEFORE PROCEDURE.   __ Have you completed all your prep solution? PLEASE DO NOT FOLLOW the insert/or box instructions from pharmacy)  __ Have you taken your blood pressure, heart, seizure, or other essential medications the morning of your procedure?  __ Have you planned for a ride to and from procedure?  (Medical Transportation, Uber, Lyft, Taxi, etc. may ONLY be used if a responsible adult is present to accompany you home). The responsible adult CANNOT be the  of the service.  person must be available to return and pick you up within 15 minutes of being notified of discharge.           Questions or Concerns?  Please call us!  [phone] (M-F) [phone] (Nights and weekends)    Listed below are some helpful tips:    Please bring protective cases for eyewear and hearing aids-wear comfortable clothing/shoes.  Follow prep instructions closely so you don't have to do it twice.  Bowel prep is prescription used to clean out the colon before a colonoscopy. The prep increases movement of your colon by causing you to have diarrhea (loose stools). Cleaning out stool from the colon helps your doctor to see in your colon clearly during this procedure.   It is important to stay hydrated before, during and after bowel prep to prevent loss of fluid (dehydration). You can have water and your  choice of clear liquids. Reminder: these liquids you will drink in addition to the bowel prep.     Preparing the mixture:    First, mix the prep with water.  Make the taste better by adding a sugar free drink mix (Crystal Light) can improve the taste of your prep.   Use a large bore (opening) straw. Place towards the back of mouth (throat) as tolerated.  Prepare a prep mixture that is lightly chilled, but not ice-cold. Drinking a large amount of ice-cold liquid can make you feel very ill.  Avoid drinking any RED beverages or eating popsicles with this color for the 24 hours leading up to your procedure. This color can look like blood in the colon.    Consuming the prep:    Take your time. If you feel ill, take a 15-minute break from drinking the prep mixture  Combat hunger and dehydration with clear liquids. Options like JELL-O, or popsicles will help.  Settle in with good reading material. The goal is to clean out 6 feet of colon, so you can plan on spending a good deal of time in the bathroom. Have some good reading material on-hand or an iPad ready to keep yourself entertained!  Keep yourself comfortable. We recommend applying personal hygiene wipes, Tucks pads and a soothing ointment, like A&D ointment, Desitin, or Vaseline to your bottom before starting and as needed to protect your skin.       If you are unsure about any of these instructions, call [facility] at [phone].                          Oral Medicine  Day of Prep  Day of Procedure           Glyburide    Do NOT take morning of procedure. If you         Glucotrol (Glipizide)  Do NOT take. take twice daily, take with dinner.         Amaryl (Glimepiride)                                Glucophage    Do NOT take morning of procedure. Take         Glumetza  Take as  when you start eating again.          Fortamet (Metformin)  prescribed.                              Januvia (Sitaglipitin)    Do NOT take morning of procedure. Take         Nesina  (Aloglipitin)    when you start eating again.          Onglyza (Saxaglipitin)  Take as              Tradjenta (Linaglipitin)  prescribed.                              Invokana (Canagliflozin)               Farxiga (Dapagliflozin)  Stop 2 days  Do NOT take morning of procedure. Take         Jardiance(Empagliflozin) before prep.  when you start eating again.                          Actos (Pioglitazone)  Take as  Do NOT take morning of procedure. Take           prescribed.  when you start eating again.                          Injectable & Combination Daily Dose  Weekly Dose           Medicine                Adlyxin (Lixisenatide)  If you take these For weekly dose, hold dose at least 8 days prior        Byetta (Exenatide)  medications daily to appointment. You may take the dose after your        Bydureon (Exenatide XL) on the day of your procedure.            Ozempic (Semaglutide)  appointment hold              Trulicity (Dulaglutide)  that dose until              Victoza (Liraglutide)  after your              Mounjaro (Tirzepatide)  procedure.              Eva Sebastian                It is important to monitor your blood sugar while doing the bowel preparation. On the day of your bowel   prep, when you are on a clear liquid diet, you may drink beverages with sugar as your source of glucose.   Be sure to mix the prep with water or sugar free liquid only. Below are instructions on how to adjust your   diabetic medications prior to your scheduled procedure. Call the healthcare provider who manages your   diabetes if you have questions.   Insulin for Type 1   Diabetes Mellitus Day of Prep                                         Day of procedure          Basaglar If you use in the morning, take as prescribed.  If you take in the morning,         Lantus If you use in the evening, inject 70% of dose. inject 80% of dose. If you take        Levemir      in the evenings, inject usual         Toujeo       dose.           Tresiba                Semglee                                Afrezza Count carbs and adjust dose   Do NOT take morning of procedure.         Apidra accordingly. If not carb counting,  Take when you start eating again.         Humalog 100 take 25% of usual meal dose.             Humalog 200 May use correction dose every              Novolog 4 hours. Do NOT use once sugar-free             liquid prep is started.                             Insulin Pump Count carbs and adjust your   May use temp basal rate at 70 % starting          dose as needed. May use temp basal at midnight until after procedure.          rate at 70 % after sugar-free clear liquid             prep is started until midnight.                              Insulin for Type 2   Diabetes Mellitus Day of Prep                                         Day of procedure          Basaglar If you use in the morning, take as prescribed.  If you take in the morning,         Lantus If you use in the evening, inject 70% of dose. inject 80% of dose. If you take        Levemir      in the evenings, inject usual         Toujeo      dose.           Tresiba                                                Afrezza Inject 50 % of dose with clear liquid diet.   Do NOT take morning of         Apidra Do NOT use once sugar-free clear liquid prep procedure. Take when you         Fiasp is started. If you are using a correction scale,  start eating meals again.         Humalog 100 take dose every 4 hours as needed.             Humalog 200                Novolog                                NPH  Inject 50% of breakfast and dinner   Do NOT take morning of         doses with clear liquid diet.    procedure. Take usual dose              when you eat dinner.                         Regular  Inject 50% of breakfast dose and do NOT take Do NOT take morning of          dinner dose once sugar-free liquid prep has   procedure. Take usual dose          started. If using  correction scale, make take dose when you eat dinner.          every 6 hours as needed.                              Novolog Mix 70/30 Inject 50% of breakfast dose. Inject 25%  Do NOT take breakfast dose.         Humolog Mix 75/25 of dinner dose.     Take usual dose when you eat         Humolog Mix 50/50      dinner.                           U500 Take 50% of AM or breakfast unit jaleel dose.  Do NOT take mornining of           Take 25% of lunch or dinner or PM unit jaleel dose.  procedure. Take when you               start eating again.                          V-Go  Continue to wear your V-Go device. Do NOT click  Coninue to wear your V-Go         once sugar-free clear liquid prep is started.   device. Resume clicks with               meals.

## 2024-10-02 ENCOUNTER — TELEPHONE (OUTPATIENT)
Dept: INTERNAL MEDICINE | Facility: CLINIC | Age: 54
End: 2024-10-02
Payer: COMMERCIAL

## 2024-10-02 DIAGNOSIS — R11.0 NAUSEA: Primary | ICD-10-CM

## 2024-10-02 RX ORDER — ONDANSETRON 4 MG/1
4 TABLET, ORALLY DISINTEGRATING ORAL EVERY 8 HOURS PRN
Qty: 20 TABLET | Refills: 0 | Status: SHIPPED | OUTPATIENT
Start: 2024-10-02

## 2024-10-02 NOTE — TELEPHONE ENCOUNTER
Lm stating nausea is common when first initiating. Should improve after a few days.  Rx for nausea was sent to pharmacy.

## 2024-10-02 NOTE — TELEPHONE ENCOUNTER
----- Message from Med Assistant Avelina sent at 10/2/2024 11:38 AM CDT -----  Contact: [phone]    ----- Message -----  From: Lupe Woods  Sent: 10/2/2024  11:25 AM CDT  To: Tony Gross Staff    1MEDICALADVICE     Patient is calling for Medical Advice regarding:started Ozempic on Monday     How long has patient had these symptoms:nauseated     Pharmacy name and phone#:  [facility] [place] - [address], [address]  [address], [address]  [place] [address]  Phone: [phone] Fax: [phone]       Patient wants a call back or thru [facility]:call back     Comments:  She is asking if the dr can call something in for the nausea   Please advise patient replies from provider may take up to 48 hours.

## 2024-10-02 NOTE — TELEPHONE ENCOUNTER
I sent zofran for nausea. Please let pt know that nausea is common when first initiating. Should improve after a few days.

## 2024-10-16 ENCOUNTER — TELEPHONE (OUTPATIENT)
Dept: INTERNAL MEDICINE | Facility: CLINIC | Age: 54
End: 2024-10-16
Payer: COMMERCIAL

## 2024-10-16 DIAGNOSIS — R06.2 WHEEZING: Primary | ICD-10-CM

## 2024-10-16 DIAGNOSIS — R06.2 WHEEZING: ICD-10-CM

## 2024-10-16 RX ORDER — ALBUTEROL SULFATE 90 UG/1
2 INHALANT RESPIRATORY (INHALATION) EVERY 6 HOURS PRN
Qty: 18 G | Refills: 0 | Status: SHIPPED | OUTPATIENT
Start: 2024-10-16 | End: 2024-10-16 | Stop reason: SDUPTHER

## 2024-10-16 RX ORDER — ALBUTEROL SULFATE 90 UG/1
2 INHALANT RESPIRATORY (INHALATION) EVERY 6 HOURS PRN
Qty: 18 G | Refills: 0 | Status: SHIPPED | OUTPATIENT
Start: 2024-10-16 | End: 2025-10-16

## 2024-10-16 NOTE — TELEPHONE ENCOUNTER
----- Message from Mesha sent at 10/16/2024  8:30 AM CDT -----  Contact: [phone]  .1MEDICALADVICE     Patient is calling for Medical Advice regarding:pt is asking for a callback about medications.  Please call her back and advise.    How long has patient had these symptoms:    Pharmacy name and phone#:    Patient wants a call back or [facility]:callback    Comments:    Please advise patient replies from provider may take up to 48 hours.

## 2024-10-16 NOTE — TELEPHONE ENCOUNTER
----- Message from Mesha sent at 10/16/2024  8:25 AM CDT -----  Contact: [phone]  .1MEDICALADVICE     Patient is calling for Medical Advice regarding:pt is calling she would like to see if you can prescribe her Albuterol  inhaler 90mcg she is running low and it is no longer on her chart. Please call her back.    How long has patient had these symptoms:    Pharmacy [facility] and phone#:  [facility] [place] - [address]  [address] [place], [place] [address] , [address]  [place] [address]  Phone: [phone] Fax: [phone]       Patient wants a call back or [facility]:callback    Comments:    Please advise patient replies from provider may take up to 48 hours.

## 2024-10-16 NOTE — TELEPHONE ENCOUNTER
Spoke with patient.  Needs a refill of her albuterol inhaler.  We will refill.  Also states Ozempic starting to make her constipated.  Discussed she can start MiraLax over-the-counter.  Can do 1 cap full b.i.d. for a few days to help get her started and then 1 daily until she is having normal bowel movements.  Hydrate very well.  She has follow up with me in 1 week

## 2024-10-16 NOTE — TELEPHONE ENCOUNTER
Care Due:                  Date            Visit Type   Department     Provider  --------------------------------------------------------------------------------                                NP -                              PRIMARY      [facility]  Last Visit: 07-      CARE ([facility])   MEDICINE       Yifan Peña  Next Visit: None Scheduled  None         None Found                                                            Last  Test          Frequency    Reason                     Performed    Due Date  --------------------------------------------------------------------------------    Office Visit  15 months..  FARXIGA, citalopram,       07-   10-                             losartan, metoprolol.....    [employer] Embedded Care Due Messages. Reference number: [id number].   10/16/2024 9:45:05 AM CDT

## 2024-12-06 ENCOUNTER — TELEPHONE (OUTPATIENT)
Dept: ENDOSCOPY | Facility: HOSPITAL | Age: 54
End: 2024-12-06
Payer: COMMERCIAL

## 2024-12-06 NOTE — TELEPHONE ENCOUNTER
Attempted to contact patient to confirm upcoming endoscopy procedure; no answer. Left voicemail for patient to return call.

## 2025-01-10 DIAGNOSIS — E11.65 TYPE 2 DIABETES MELLITUS WITH HYPERGLYCEMIA, WITHOUT LONG-TERM CURRENT USE OF INSULIN: ICD-10-CM

## 2025-01-10 NOTE — TELEPHONE ENCOUNTER
Care Due:                  Date            Visit Type   Department     Provider  --------------------------------------------------------------------------------                                NP -                              PRIMARY      [facility] INTER[facility]  Last Visit: 07-      CARE ([facility])   MEDICINE       Yifan Peña  Next Visit: None Scheduled  None         None Found                                                            Last  Test          Frequency    Reason                     Performed    Due Date  --------------------------------------------------------------------------------    Office Visit  15 months..  FARXIGA, citalopram,       07-   10-                             losartan, metoprolol.....    HBA1C.......  6 months...  FARXIGA..................  09- 03-    [facility] Embedded Care Due Messages. Reference number: [id number].   1/10/2025 10:02:17 AM CST

## 2025-01-13 ENCOUNTER — LAB VISIT (OUTPATIENT)
Dept: LAB | Facility: HOSPITAL | Age: 55
End: 2025-01-13
Payer: COMMERCIAL

## 2025-01-13 ENCOUNTER — OFFICE VISIT (OUTPATIENT)
Dept: INTERNAL MEDICINE | Facility: CLINIC | Age: 55
End: 2025-01-13
Payer: COMMERCIAL

## 2025-01-13 VITALS
DIASTOLIC BLOOD PRESSURE: 74 MMHG | WEIGHT: 182.56 LBS | BODY MASS INDEX: 32.35 KG/M2 | OXYGEN SATURATION: 96 % | HEART RATE: 78 BPM | HEIGHT: 63 IN | SYSTOLIC BLOOD PRESSURE: 124 MMHG

## 2025-01-13 DIAGNOSIS — L30.9 ECZEMA, UNSPECIFIED TYPE: ICD-10-CM

## 2025-01-13 DIAGNOSIS — I10 PRIMARY HYPERTENSION: ICD-10-CM

## 2025-01-13 DIAGNOSIS — R11.0 NAUSEA: ICD-10-CM

## 2025-01-13 DIAGNOSIS — E78.5 HYPERLIPIDEMIA, UNSPECIFIED HYPERLIPIDEMIA TYPE: ICD-10-CM

## 2025-01-13 DIAGNOSIS — F41.9 ANXIETY: ICD-10-CM

## 2025-01-13 DIAGNOSIS — E11.9 TYPE 2 DIABETES MELLITUS WITHOUT COMPLICATION, WITHOUT LONG-TERM CURRENT USE OF INSULIN: ICD-10-CM

## 2025-01-13 DIAGNOSIS — E11.9 TYPE 2 DIABETES MELLITUS WITHOUT COMPLICATION, WITHOUT LONG-TERM CURRENT USE OF INSULIN: Primary | ICD-10-CM

## 2025-01-13 DIAGNOSIS — R06.2 WHEEZING: ICD-10-CM

## 2025-01-13 LAB
ALBUMIN SERPL BCP-MCNC: 3.7 G/DL (ref 3.5–5.2)
ALP SERPL-CCNC: 96 U/L (ref 40–150)
ALT SERPL W/O P-5'-P-CCNC: 10 U/L (ref 10–44)
ANION GAP SERPL CALC-SCNC: 12 MMOL/L (ref 8–16)
AST SERPL-CCNC: 16 U/L (ref 10–40)
BILIRUB SERPL-MCNC: 0.3 MG/DL (ref 0.1–1)
BUN SERPL-MCNC: 19 MG/DL (ref 6–20)
CALCIUM SERPL-MCNC: 9.4 MG/DL (ref 8.7–10.5)
CHLORIDE SERPL-SCNC: 105 MMOL/L (ref 95–110)
CHOLEST SERPL-MCNC: 150 MG/DL (ref 120–199)
CHOLEST/HDLC SERPL: 3.5 {RATIO} (ref 2–5)
CO2 SERPL-SCNC: 20 MMOL/L (ref 23–29)
CREAT SERPL-MCNC: 0.6 MG/DL (ref 0.5–1.4)
EST. GFR  (NO RACE VARIABLE): >60 ML/MIN/1.73 M^2
ESTIMATED AVG GLUCOSE: 177 MG/DL (ref 68–131)
GLUCOSE SERPL-MCNC: 129 MG/DL (ref 70–110)
HBA1C MFR BLD: 7.8 % (ref 4–5.6)
HDLC SERPL-MCNC: 43 MG/DL (ref 40–75)
HDLC SERPL: 28.7 % (ref 20–50)
LDLC SERPL CALC-MCNC: 81.8 MG/DL (ref 63–159)
NONHDLC SERPL-MCNC: 107 MG/DL
POTASSIUM SERPL-SCNC: 4 MMOL/L (ref 3.5–5.1)
PROT SERPL-MCNC: 7.4 G/DL (ref 6–8.4)
SODIUM SERPL-SCNC: 137 MMOL/L (ref 136–145)
TRIGL SERPL-MCNC: 126 MG/DL (ref 30–150)

## 2025-01-13 PROCEDURE — 3074F SYST BP LT 130 MM HG: CPT | Mod: CPTII,S$GLB,, | Performed by: NURSE PRACTITIONER

## 2025-01-13 PROCEDURE — 3078F DIAST BP <80 MM HG: CPT | Mod: CPTII,S$GLB,, | Performed by: NURSE PRACTITIONER

## 2025-01-13 PROCEDURE — 83036 HEMOGLOBIN GLYCOSYLATED A1C: CPT | Performed by: NURSE PRACTITIONER

## 2025-01-13 PROCEDURE — 1159F MED LIST DOCD IN RCRD: CPT | Mod: CPTII,S$GLB,, | Performed by: NURSE PRACTITIONER

## 2025-01-13 PROCEDURE — 1160F RVW MEDS BY RX/DR IN RCRD: CPT | Mod: CPTII,S$GLB,, | Performed by: NURSE PRACTITIONER

## 2025-01-13 PROCEDURE — 3051F HG A1C>EQUAL 7.0%<8.0%: CPT | Mod: CPTII,S$GLB,, | Performed by: NURSE PRACTITIONER

## 2025-01-13 PROCEDURE — 36415 COLL VENOUS BLD VENIPUNCTURE: CPT | Performed by: NURSE PRACTITIONER

## 2025-01-13 PROCEDURE — 80053 COMPREHEN METABOLIC PANEL: CPT | Performed by: NURSE PRACTITIONER

## 2025-01-13 PROCEDURE — 4010F ACE/ARB THERAPY RXD/TAKEN: CPT | Mod: CPTII,S$GLB,, | Performed by: NURSE PRACTITIONER

## 2025-01-13 PROCEDURE — 80061 LIPID PANEL: CPT | Performed by: NURSE PRACTITIONER

## 2025-01-13 PROCEDURE — 3008F BODY MASS INDEX DOCD: CPT | Mod: CPTII,S$GLB,, | Performed by: NURSE PRACTITIONER

## 2025-01-13 PROCEDURE — 99214 OFFICE O/P EST MOD 30 MIN: CPT | Mod: S$GLB,,, | Performed by: NURSE PRACTITIONER

## 2025-01-13 PROCEDURE — 99999 PR PBB SHADOW E&M-EST. PATIENT-LVL IV: CPT | Mod: PBBFAC,,, | Performed by: NURSE PRACTITIONER

## 2025-01-13 RX ORDER — ATORVASTATIN CALCIUM 10 MG/1
10 TABLET, FILM COATED ORAL DAILY
Qty: 90 TABLET | Refills: 1 | Status: SHIPPED | OUTPATIENT
Start: 2025-01-13 | End: 2026-01-13

## 2025-01-13 RX ORDER — INSULIN PUMP SYRINGE, 3 ML
EACH MISCELLANEOUS
Qty: 1 EACH | Refills: 0 | Status: SHIPPED | OUTPATIENT
Start: 2025-01-13 | End: 2026-01-13

## 2025-01-13 RX ORDER — LOSARTAN POTASSIUM 100 MG/1
100 TABLET ORAL DAILY
Qty: 90 TABLET | Refills: 3 | Status: SHIPPED | OUTPATIENT
Start: 2025-01-13

## 2025-01-13 RX ORDER — DAPAGLIFLOZIN 10 MG/1
10 TABLET, FILM COATED ORAL DAILY
Qty: 90 TABLET | Refills: 1 | Status: SHIPPED | OUTPATIENT
Start: 2025-01-13

## 2025-01-13 RX ORDER — ALBUTEROL SULFATE 90 UG/1
2 INHALANT RESPIRATORY (INHALATION) EVERY 6 HOURS PRN
Qty: 18 G | Refills: 0 | Status: SHIPPED | OUTPATIENT
Start: 2025-01-13 | End: 2026-01-13

## 2025-01-13 RX ORDER — SEMAGLUTIDE 1.34 MG/ML
0.5 INJECTION, SOLUTION SUBCUTANEOUS
COMMUNITY
End: 2025-01-13 | Stop reason: SDUPTHER

## 2025-01-13 RX ORDER — SEMAGLUTIDE 0.68 MG/ML
0.5 INJECTION, SOLUTION SUBCUTANEOUS
Qty: 3 EACH | Refills: 1 | Status: SHIPPED | OUTPATIENT
Start: 2025-01-13

## 2025-01-13 RX ORDER — ONDANSETRON 4 MG/1
4 TABLET, ORALLY DISINTEGRATING ORAL EVERY 8 HOURS PRN
Qty: 20 TABLET | Refills: 0 | Status: SHIPPED | OUTPATIENT
Start: 2025-01-13

## 2025-01-13 RX ORDER — CITALOPRAM 10 MG/1
10 TABLET ORAL EVERY MORNING
Qty: 90 TABLET | Refills: 3 | Status: SHIPPED | OUTPATIENT
Start: 2025-01-13

## 2025-01-13 RX ORDER — METFORMIN HYDROCHLORIDE 500 MG/1
1000 TABLET, EXTENDED RELEASE ORAL 2 TIMES DAILY WITH MEALS
Qty: 360 TABLET | Refills: 0 | Status: SHIPPED | OUTPATIENT
Start: 2025-01-13 | End: 2026-01-13

## 2025-01-13 RX ORDER — METOPROLOL SUCCINATE 100 MG/1
100 TABLET, EXTENDED RELEASE ORAL DAILY
Qty: 90 TABLET | Refills: 0 | Status: SHIPPED | OUTPATIENT
Start: 2025-01-13

## 2025-01-13 RX ORDER — MONTELUKAST SODIUM 10 MG/1
10 TABLET ORAL NIGHTLY
Qty: 30 TABLET | Refills: 0 | Status: SHIPPED | OUTPATIENT
Start: 2025-01-13 | End: 2025-02-12

## 2025-01-13 RX ORDER — TRIAMCINOLONE ACETONIDE 1 MG/G
CREAM TOPICAL 2 TIMES DAILY
Qty: 28.4 G | Refills: 2 | Status: SHIPPED | OUTPATIENT
Start: 2025-01-13

## 2025-01-13 RX ORDER — DAPAGLIFLOZIN 10 MG/1
10 TABLET, FILM COATED ORAL
Qty: 90 TABLET | Refills: 0 | OUTPATIENT
Start: 2025-01-13

## 2025-01-13 RX ORDER — LANCETS
EACH MISCELLANEOUS
Qty: 100 EACH | Refills: 1 | Status: SHIPPED | OUTPATIENT
Start: 2025-01-13

## 2025-01-13 NOTE — PROGRESS NOTES
INTERNAL MEDICINE PROGRESS/URGENT CARE NOTE    CHIEF COMPLAINT     Chief Complaint   Patient presents with    Medication Refill       HPI     Waleska Weir is a 54 y.o. female who presents for a follow up visit today.    Saw her 3 months ago. Her A1c was over 10. Had a pretty poor diet. Increased her metformin to 1 G BID and started her on ozempic. She is still on farxiga. She hasn't been checking her sugars. Lost about 7 lbs. Just increased to the 0.5 mg 2-3 weeks ago. Overdue for eye exam.     HTN- on losartan 100 mg and toprol 100 mg. Doesn't check her Bps. Asymptomatic.     HLD- on lipitor 10 mg    She was having a lot of wheezing incidentally on exam. Non smoker. CXR was clear. She did not get PFTs done. 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 She does not notice the wheezing. She denies any sob or dyspnea on exertion. States she feels fine. Denies any hx of asthma. She does have eczema on her hands. This has flared recently given dry/cold weather. Needs refill of steroid cream. Received allergy shots as a child. Recent eosinophil count elevated.     Still needs mmg and c-scope.    Patient Active Problem List   Diagnosis    Condyloma acuminatum of vulva    Hyperlipidemia    Hypertension    Low grade squamous intraepithelial lesion (LGSIL) on cervical Pap smear    Type 2 diabetes mellitus        Past Medical History:  No past medical history on file.     Past Surgical History:  No past surgical history on file.     Allergies:  Review of patient's allergies indicates:  No Known Allergies    Home Medications:    Current Outpatient Medications:     albuterol (VENTOLIN HFA) 90 mcg/actuation inhaler, Inhale 2 puffs into the lungs every 6 (six) hours as needed for Wheezing. Rescue, Disp: 18 g, Rfl: 0    atorvastatin (LIPITOR) 10 MG tablet, Take 1 tablet (10 mg total) by mouth once daily., Disp: 90 tablet, Rfl: 1    blood sugar diagnostic Strp, To check BG 1 times daily, to use  with insurance preferred meter, Disp: 100 strip, Rfl: 1    blood-glucose meter kit, To check BG 1 times daily, to use with insurance preferred meter, Disp: 1 each, Rfl: 0    citalopram (CELEXA) 10 MG tablet, Take 1 tablet (10 mg total) by mouth every morning., Disp: 90 tablet, Rfl: 3    dapagliflozin propanediol (FARXIGA) 10 mg tablet, Take 1 tablet (10 mg total) by mouth once daily., Disp: 90 tablet, Rfl: 1    hydrocortisone 2.5 % cream, Apply topically 2 (two) times daily as needed (rash)., Disp: 28 g, Rfl: 0    lancets Misc, To check BG 1 times daily, to use with insurance preferred meter, Disp: 100 each, Rfl: 1    losartan (COZAAR) 100 MG tablet, Take 1 tablet (100 mg total) by mouth once daily., Disp: 90 tablet, Rfl: 3    metFORMIN (GLUCOPHAGE-XR) 500 MG ER 24hr tablet, Take 2 tablets (1,000 mg total) by mouth 2 (two) times daily with meals., Disp: 360 tablet, Rfl: 0    metoprolol succinate (TOPROL-XL) 100 MG 24 hr tablet, Take 1 tablet (100 mg total) by mouth once daily., Disp: 90 tablet, Rfl: 0    montelukast (SINGULAIR) 10 mg tablet, Take 1 tablet (10 mg total) by mouth every evening., Disp: 30 tablet, Rfl: 0    ondansetron (ZOFRAN-ODT) 4 MG TbDL, Take 1 tablet (4 mg total) by mouth every 8 (eight) hours as needed., Disp: 20 tablet, Rfl: 0    semaglutide (OZEMPIC) 0.25 mg or 0.5 mg (2 mg/3 mL) pen injector, Inject 0.5 mg into the skin every 7 days., Disp: 3 each, Rfl: 1    triamcinolone acetonide 0.1% (KENALOG) 0.1 % cream, Apply topically 2 (two) times daily., Disp: 28.4 g, Rfl: 2     Review of Systems:  Review of Systems   Constitutional:  Negative for fatigue and fever.   HENT:  Negative for congestion and sore throat.    Respiratory:  Negative for cough, chest tightness and shortness of breath.    Cardiovascular:  Negative for chest pain.   Gastrointestinal:  Negative for abdominal pain, constipation, diarrhea, nausea and vomiting.   Genitourinary:  Negative for dysuria and urgency.   Musculoskeletal:   "Negative for arthralgias.   Skin:  Positive for rash.   Neurological:  Negative for weakness and headaches.         PHYSICAL EXAM     Vitals:    01/13/25 1537   BP: 124/74   BP Location: Left arm   Patient Position: Sitting   Pulse: 78   SpO2: 96%   Weight: 82.8 kg (182 lb 8.7 oz)   Height: 5' 3" (1.6 m)      Body mass index is 32.34 kg/m².     Physical Exam  Vitals reviewed.   Constitutional:       Appearance: Normal appearance.   HENT:      Head: Normocephalic.      Right Ear: Tympanic membrane normal.      Left Ear: Tympanic membrane normal.      Mouth/Throat:      Mouth: Mucous membranes are moist.      Pharynx: Oropharynx is clear.   Eyes:      Conjunctiva/sclera: Conjunctivae normal.      Pupils: Pupils are equal, round, and reactive to light.   Cardiovascular:      Rate and Rhythm: Normal rate and regular rhythm.   Pulmonary:      Effort: Pulmonary effort is normal.      Breath sounds: No rhonchi or rales.      Comments: Has mild expiratory wheezes in bases  Musculoskeletal:      Right lower leg: No edema.      Left lower leg: No edema.   Lymphadenopathy:      Cervical: No cervical adenopathy.   Skin:     General: Skin is warm and dry.      Comments: Eczema to hands   Neurological:      General: No focal deficit present.      Mental Status: She is alert.   Psychiatric:         Mood and Affect: Mood normal.         Behavior: Behavior normal.         LABS     Lab Results   Component Value Date    HGBA1C 7.8 (H) 01/13/2025     CMP  Sodium   Date Value Ref Range Status   01/13/2025 137 136 - 145 mmol/L Final     Potassium   Date Value Ref Range Status   01/13/2025 4.0 3.5 - 5.1 mmol/L Final     Chloride   Date Value Ref Range Status   01/13/2025 105 95 - 110 mmol/L Final     CO2   Date Value Ref Range Status   01/13/2025 20 (L) 23 - 29 mmol/L Final     Glucose   Date Value Ref Range Status   01/13/2025 129 (H) 70 - 110 mg/dL Final     BUN   Date Value Ref Range Status   01/13/2025 19 6 - 20 mg/dL Final "     Creatinine   Date Value Ref Range Status   01/13/2025 0.6 0.5 - 1.4 mg/dL Final     Calcium   Date Value Ref Range Status   01/13/2025 9.4 8.7 - 10.5 mg/dL Final     Total Protein   Date Value Ref Range Status   01/13/2025 7.4 6.0 - 8.4 g/dL Final     Albumin   Date Value Ref Range Status   01/13/2025 3.7 3.5 - 5.2 g/dL Final     Total Bilirubin   Date Value Ref Range Status   01/13/2025 0.3 0.1 - 1.0 mg/dL Final     Comment:     For infants and newborns, interpretation of results should be based  on gestational age, weight and in agreement with clinical  observations.    Premature Infant recommended reference ranges:  Up to 24 hours.............<8.0 mg/dL  Up to 48 hours............<12.0 mg/dL  3-5 days..................<15.0 mg/dL  6-29 days.................<15.0 mg/dL       Alkaline Phosphatase   Date Value Ref Range Status   01/13/2025 96 40 - 150 U/L Final     AST   Date Value Ref Range Status   01/13/2025 16 10 - 40 U/L Final     ALT   Date Value Ref Range Status   01/13/2025 10 10 - 44 U/L Final     Anion Gap   Date Value Ref Range Status   01/13/2025 12 8 - 16 mmol/L Final     Lab Results   Component Value Date    WBC 5.44 09/23/2024    HGB 13.9 09/23/2024    HCT 43.6 09/23/2024    MCV 88 09/23/2024     09/23/2024     Lab Results   Component Value Date    CHOL 150 01/13/2025    CHOL 219 (H) 09/23/2024    CHOL 219 (H) 09/23/2024     Lab Results   Component Value Date    HDL 43 01/13/2025    HDL 46 09/23/2024    HDL 46 09/23/2024     Lab Results   Component Value Date    LDLCALC 81.8 01/13/2025    LDLCALC 139.0 09/23/2024    LDLCALC 139.0 09/23/2024     Lab Results   Component Value Date    TRIG 126 01/13/2025    TRIG 170 (H) 09/23/2024    TRIG 170 (H) 09/23/2024     Lab Results   Component Value Date    CHOLHDL 28.7 01/13/2025    CHOLHDL 21.0 09/23/2024    CHOLHDL 21.0 09/23/2024     Lab Results   Component Value Date    TSH 2.518 09/23/2024       ASSESSMENT     1. Type 2 diabetes mellitus without  complication, without long-term current use of insulin    2. Eczema, unspecified type    3. Nausea    4. Wheezing    5. Primary hypertension    6. Anxiety    7. Hyperlipidemia, unspecified hyperlipidemia type           PLAN  1. Type 2 diabetes mellitus without complication, without long-term current use of insulin  Check A1c today. Continue current meds. Last luann okay. On statin. Needs eye exam. Low carb/sugar diet  -     Comprehensive Metabolic Panel; Future; Expected date: 01/13/2025  -     Hemoglobin A1C; Future; Expected date: 01/13/2025  -     Lipid Panel; Future; Expected date: 01/13/2025  -     blood-glucose meter kit; To check BG 1 times daily, to use with insurance preferred meter  Dispense: 1 each; Refill: 0  -     lancets Misc; To check BG 1 times daily, to use with insurance preferred meter  Dispense: 100 each; Refill: 1  -     blood sugar diagnostic Strp; To check BG 1 times daily, to use with insurance preferred meter  Dispense: 100 strip; Refill: 1  -     Ambulatory referral/consult to Optometry; Future; Expected date: 01/20/2025  -     semaglutide (OZEMPIC) 0.25 mg or 0.5 mg (2 mg/3 mL) pen injector; Inject 0.5 mg into the skin every 7 days.  Dispense: 3 each; Refill: 1  -     metFORMIN (GLUCOPHAGE-XR) 500 MG ER 24hr tablet; Take 2 tablets (1,000 mg total) by mouth 2 (two) times daily with meals.  Dispense: 360 tablet; Refill: 0    2. Eczema, unspecified type  Use emollient as well  -     triamcinolone acetonide 0.1% (KENALOG) 0.1 % cream; Apply topically 2 (two) times daily.  Dispense: 28.4 g; Refill: 2    3. Nausea  Prn with increase in ozempic. The 0.5 mg dose did cause some nausea/constipation but much improved.   -     ondansetron (ZOFRAN-ODT) 4 MG TbDL; Take 1 tablet (4 mg total) by mouth every 8 (eight) hours as needed.  Dispense: 20 tablet; Refill: 0    4. Wheezing  Needs PFTs. Can trial singulair and use albuterol prn wheezing.   -     albuterol (VENTOLIN HFA) 90 mcg/actuation inhaler;  Inhale 2 puffs into the lungs every 6 (six) hours as needed for Wheezing. Rescue  Dispense: 18 g; Refill: 0  -     montelukast (SINGULAIR) 10 mg tablet; Take 1 tablet (10 mg total) by mouth every evening.  Dispense: 30 tablet; Refill: 0  -     Complete PFT w/ bronchodilator; Future    5. Primary hypertension  Stable. Continue current treatment.     Comments:  on losartan 100 mg, metoprolol succinate 100 mg  Orders:  -     losartan (COZAAR) 100 MG tablet; Take 1 tablet (100 mg total) by mouth once daily.  Dispense: 90 tablet; Refill: 3  -     metoprolol succinate (TOPROL-XL) 100 MG 24 hr tablet; Take 1 tablet (100 mg total) by mouth once daily.  Dispense: 90 tablet; Refill: 0    6. Anxiety  Stable. Continue current treatment.    Comments:  medications reviewed, prescribed citalopram 10 mg daily  Orders:  -     citalopram (CELEXA) 10 MG tablet; Take 1 tablet (10 mg total) by mouth every morning.  Dispense: 90 tablet; Refill: 3    7. Hyperlipidemia, unspecified hyperlipidemia type  Check repeat lipid panel and hep function.  -     atorvastatin (LIPITOR) 10 MG tablet; Take 1 tablet (10 mg total) by mouth once daily.  Dispense: 90 tablet; Refill: 1     Follow up in 3 months    Patient's plan/treatment was discussed including medications and possible side effects. Verbalized understanding of all instructions.     This note was partly generated with [employer] voice recognition software. I apologize for any possible typographical errors.          ERNIE Bazan  Department of Internal Medicine - [facility]  01/14/2025

## 2025-01-14 ENCOUNTER — TELEPHONE (OUTPATIENT)
Dept: INTERNAL MEDICINE | Facility: CLINIC | Age: 55
End: 2025-01-14
Payer: COMMERCIAL

## 2025-01-14 NOTE — PROGRESS NOTES
Prior authorization has been initiated through [employer]. Authorization is currently pending approval by insurance company.May take 24-72 hours for your insurance to respond

## 2025-01-14 NOTE — TELEPHONE ENCOUNTER
----- Message from Ginny Jimenez NP sent at 1/14/2025  8:20 AM CST -----  Please call patient and let her know that her A1c has improved greatly. Down to 7.8 but still not under good control. I want this to be less than 7. So close! I want her to continue her current meds for now and continue to really watch her diet and watch her intake of sweets/sugars/carbs. Her cholesterol looks much better, too!   Continue current meds and keep follow up in 3 months.

## 2025-01-16 ENCOUNTER — PATIENT OUTREACH (OUTPATIENT)
Dept: INTERNAL MEDICINE | Facility: CLINIC | Age: 55
End: 2025-01-16
Payer: COMMERCIAL

## 2025-01-16 DIAGNOSIS — Z12.4 ENCOUNTER FOR SCREENING FOR CERVICAL CANCER: Primary | ICD-10-CM

## 2025-01-16 DIAGNOSIS — Z01.00 DIABETIC EYE EXAM: ICD-10-CM

## 2025-01-16 DIAGNOSIS — E11.9 DIABETIC EYE EXAM: ICD-10-CM

## 2025-01-16 DIAGNOSIS — Z12.11 COLON CANCER SCREENING: ICD-10-CM

## 2025-01-16 NOTE — PROGRESS NOTES
Chart reviewed and updated. Reconciled immunizations.  Plaed referrals for Colonoscopy, diabetic eye cam and GYN.      Yessi Sinha [facility]  Panel Care Coordinator  [facility]  [phone]  For Yifan Peña MD

## 2025-01-17 ENCOUNTER — TELEPHONE (OUTPATIENT)
Dept: INTERNAL MEDICINE | Facility: CLINIC | Age: 55
End: 2025-01-17
Payer: COMMERCIAL

## 2025-01-17 NOTE — TELEPHONE ENCOUNTER
----- Message from Libia sent at 1/17/2025  3:44 PM CST -----  Contact: [phone]  Type: Returning a call    Who left a message? office    When did the practice call? Yesterday or day before    Does patient know what this is regarding: yes    Would the patient rather a call back or a response via [facility]? call    Comments:

## 2025-02-21 ENCOUNTER — TELEPHONE (OUTPATIENT)
Dept: INTERNAL MEDICINE | Facility: CLINIC | Age: 55
End: 2025-02-21
Payer: COMMERCIAL

## 2025-02-21 NOTE — TELEPHONE ENCOUNTER
----- Message from Ynes sent at 2/21/2025  3:43 PM CST -----  Contact: Pharmacy [phone]  Requesting an RX refill or new RX.Is this a refill or new RX: RefillRX name and strength (montelukast (SINGULAIR) 10 mg tablet  Is this a 30 day or 90 day RX: 30Pharmacy name and phone # [facility] [place] - [address], [address] [facility] [address] [place] [address]Qcnvo: [phone] Fax: [phone] states patient does not has any refill left. Thank you

## 2025-02-24 ENCOUNTER — TELEPHONE (OUTPATIENT)
Dept: INTERNAL MEDICINE | Facility: CLINIC | Age: 55
End: 2025-02-24
Payer: COMMERCIAL

## 2025-02-24 DIAGNOSIS — R06.2 WHEEZING: Primary | ICD-10-CM

## 2025-02-24 RX ORDER — MONTELUKAST SODIUM 10 MG/1
10 TABLET ORAL NIGHTLY
Qty: 30 TABLET | Refills: 5 | Status: SHIPPED | OUTPATIENT
Start: 2025-02-24 | End: 2025-03-26

## 2025-02-24 NOTE — TELEPHONE ENCOUNTER
----- Message from Ginny Jimenez NP sent at 2/24/2025  1:49 PM CST -----  Contact: [phone]@patient  I did not  ----- Message -----  From: Snehal Orellana MA  Sent: 2/24/2025   1:36 PM CST  To: Ginny Jimenez NP    Did you by chance call her?  ----- Message -----  From: Noemi Platt  Sent: 2/24/2025   1:13 PM CST  To: Tony Gross Staff    Type: Returning a callWho left a message? Patient When did the practice call?Does patient know what this is regarding:Would the patient rather a call back or a response via [facility]? Call back Comments: Patient would like to discuss getting a refill that is not on her med refill list.

## 2025-04-10 ENCOUNTER — TELEPHONE (OUTPATIENT)
Dept: INTERNAL MEDICINE | Facility: CLINIC | Age: 55
End: 2025-04-10
Payer: COMMERCIAL

## 2025-04-10 DIAGNOSIS — E11.9 TYPE 2 DIABETES MELLITUS WITHOUT COMPLICATION, WITHOUT LONG-TERM CURRENT USE OF INSULIN: ICD-10-CM

## 2025-04-10 RX ORDER — METFORMIN HYDROCHLORIDE 500 MG/1
1000 TABLET, EXTENDED RELEASE ORAL 2 TIMES DAILY WITH MEALS
Qty: 360 TABLET | Refills: 0 | Status: SHIPPED | OUTPATIENT
Start: 2025-04-10 | End: 2026-04-10

## 2025-04-10 NOTE — TELEPHONE ENCOUNTER
----- Message from David sent at 4/10/2025  8:23 AM CDT -----  Contact: [phone]  Requesting an RX refill or new RX.Is this a refill or new RX: refillRX name and strength (copy/paste from chart):  metFORMIN (GLUCOPHAGE-XR) 500 MG ER 24hr tabletIs this a 30 day or 90 day RX: 360Pharmacy name and phone # (copy/paste from chart):  [facility] - [place] - [address], [address] [facility] [address] [place] [id number]: [phone] Fax: [phone] The doctors have asked that we provide their patients with the following 2 reminders -- prescription refills can take up to 72 hours, and a friendly reminder that in the future you can use your [facility] account to request refills:

## 2025-05-14 DIAGNOSIS — I10 PRIMARY HYPERTENSION: ICD-10-CM

## 2025-05-14 RX ORDER — METOPROLOL SUCCINATE 100 MG/1
100 TABLET, EXTENDED RELEASE ORAL DAILY
Qty: 90 TABLET | Refills: 0 | Status: SHIPPED | OUTPATIENT
Start: 2025-05-14

## 2025-05-14 NOTE — TELEPHONE ENCOUNTER
No care due was identified.  [facility] Embedded Care Due Messages. Reference number: [id number].   5/14/2025 11:09:53 AM CDT

## 2025-05-14 NOTE — TELEPHONE ENCOUNTER
Refill Routing Note   Medication(s) are not appropriate for processing by [facility] for the following reason(s):        Patient not seen by provider within 15 months  No active prescription written by provider    [facility] action(s):  Defer      Medication Therapy Plan: Last ordered: 1/13/25 by Ginny Jimenez NP. Recent OV but no current order by PCP      Appointments  past 12m or future 3m with PCP    Date Provider   Last Visit   7/17/2023 Yifan Peña MD   Next Visit   Visit date not found Yifan Peña MD   ED visits in past 90 days: 0        Note composed:4:13 PM 05/14/2025

## 2025-05-14 NOTE — TELEPHONE ENCOUNTER
Copied from CRM #[id number]. Topic: Medications - Medication Refill  >> May 14, 2025 11:03 AM Lupe wrote:  Requesting an RX refill or new RX.    Is this a refill or new RX: new    RX name and strength (copy/paste from chart):  metoprolol succinate (TOPROL-XL) 100 MG 24 hr tablet    Is this a 30 day or 90 day RX: 90    Pharmacy name and phone # (copy/paste from chart):  [facility] - [place], [place] - [address] [place] [address], [address]  [address] [place] [address] [address] [facility] [address]  Phone: [phone] Fax: [phone]  Hours: Not open 24 hours        The doctors have asked that we provide their patients with the following 2 reminders -- prescription refills can take up to 72 hours, and a friendly reminder that in the future you can use your [facility] account to request refills: y

## 2025-07-14 ENCOUNTER — LAB VISIT (OUTPATIENT)
Dept: LAB | Facility: HOSPITAL | Age: 55
End: 2025-07-14
Payer: COMMERCIAL

## 2025-07-14 ENCOUNTER — OFFICE VISIT (OUTPATIENT)
Dept: INTERNAL MEDICINE | Facility: CLINIC | Age: 55
End: 2025-07-14
Payer: COMMERCIAL

## 2025-07-14 ENCOUNTER — TELEPHONE (OUTPATIENT)
Dept: INTERNAL MEDICINE | Facility: CLINIC | Age: 55
End: 2025-07-14

## 2025-07-14 VITALS
BODY MASS INDEX: 32.23 KG/M2 | HEIGHT: 63 IN | WEIGHT: 181.88 LBS | SYSTOLIC BLOOD PRESSURE: 118 MMHG | HEART RATE: 91 BPM | DIASTOLIC BLOOD PRESSURE: 78 MMHG | OXYGEN SATURATION: 96 %

## 2025-07-14 DIAGNOSIS — F41.9 ANXIETY: ICD-10-CM

## 2025-07-14 DIAGNOSIS — E11.9 TYPE 2 DIABETES MELLITUS WITHOUT COMPLICATION, WITHOUT LONG-TERM CURRENT USE OF INSULIN: ICD-10-CM

## 2025-07-14 DIAGNOSIS — R06.2 WHEEZING: ICD-10-CM

## 2025-07-14 DIAGNOSIS — E11.69 TYPE 2 DIABETES MELLITUS WITH OTHER SPECIFIED COMPLICATION, WITHOUT LONG-TERM CURRENT USE OF INSULIN: Primary | ICD-10-CM

## 2025-07-14 DIAGNOSIS — K21.9 GASTROESOPHAGEAL REFLUX DISEASE, UNSPECIFIED WHETHER ESOPHAGITIS PRESENT: ICD-10-CM

## 2025-07-14 DIAGNOSIS — I10 HYPERTENSION, UNSPECIFIED TYPE: ICD-10-CM

## 2025-07-14 DIAGNOSIS — E78.5 HYPERLIPIDEMIA, UNSPECIFIED HYPERLIPIDEMIA TYPE: ICD-10-CM

## 2025-07-14 DIAGNOSIS — R42 DIZZINESS: ICD-10-CM

## 2025-07-14 DIAGNOSIS — Z12.4 CERVICAL CANCER SCREENING: ICD-10-CM

## 2025-07-14 DIAGNOSIS — E11.69 TYPE 2 DIABETES MELLITUS WITH OTHER SPECIFIED COMPLICATION, WITHOUT LONG-TERM CURRENT USE OF INSULIN: ICD-10-CM

## 2025-07-14 DIAGNOSIS — I10 PRIMARY HYPERTENSION: ICD-10-CM

## 2025-07-14 DIAGNOSIS — Z12.11 COLON CANCER SCREENING: ICD-10-CM

## 2025-07-14 LAB
ABSOLUTE EOSINOPHIL (OHS): 0.61 K/UL
ABSOLUTE MONOCYTE (OHS): 0.82 K/UL (ref 0.3–1)
ABSOLUTE NEUTROPHIL COUNT (OHS): 4 K/UL (ref 1.8–7.7)
BASOPHILS # BLD AUTO: 0.05 K/UL
BASOPHILS NFR BLD AUTO: 0.6 %
ERYTHROCYTE [DISTWIDTH] IN BLOOD BY AUTOMATED COUNT: 13.2 % (ref 11.5–14.5)
HCT VFR BLD AUTO: 42.9 % (ref 37–48.5)
HGB BLD-MCNC: 13.9 GM/DL (ref 12–16)
IMM GRANULOCYTES # BLD AUTO: 0.03 K/UL (ref 0–0.04)
IMM GRANULOCYTES NFR BLD AUTO: 0.4 % (ref 0–0.5)
LYMPHOCYTES # BLD AUTO: 2.5 K/UL (ref 1–4.8)
MCH RBC QN AUTO: 28 PG (ref 27–31)
MCHC RBC AUTO-ENTMCNC: 32.4 G/DL (ref 32–36)
MCV RBC AUTO: 87 FL (ref 82–98)
NUCLEATED RBC (/100WBC) (OHS): 0 /100 WBC
PLATELET # BLD AUTO: 317 K/UL (ref 150–450)
PMV BLD AUTO: 10.5 FL (ref 9.2–12.9)
RBC # BLD AUTO: 4.96 M/UL (ref 4–5.4)
RELATIVE EOSINOPHIL (OHS): 7.6 %
RELATIVE LYMPHOCYTE (OHS): 31.2 % (ref 18–48)
RELATIVE MONOCYTE (OHS): 10.2 % (ref 4–15)
RELATIVE NEUTROPHIL (OHS): 50 % (ref 38–73)
WBC # BLD AUTO: 8.01 K/UL (ref 3.9–12.7)

## 2025-07-14 PROCEDURE — G2211 COMPLEX E/M VISIT ADD ON: HCPCS | Mod: S$GLB,,, | Performed by: NURSE PRACTITIONER

## 2025-07-14 PROCEDURE — 3078F DIAST BP <80 MM HG: CPT | Mod: CPTII,S$GLB,, | Performed by: NURSE PRACTITIONER

## 2025-07-14 PROCEDURE — 3008F BODY MASS INDEX DOCD: CPT | Mod: CPTII,S$GLB,, | Performed by: NURSE PRACTITIONER

## 2025-07-14 PROCEDURE — 99999 PR PBB SHADOW E&M-EST. PATIENT-LVL IV: CPT | Mod: PBBFAC,,, | Performed by: NURSE PRACTITIONER

## 2025-07-14 PROCEDURE — 80053 COMPREHEN METABOLIC PANEL: CPT

## 2025-07-14 PROCEDURE — 99214 OFFICE O/P EST MOD 30 MIN: CPT | Mod: S$GLB,,, | Performed by: NURSE PRACTITIONER

## 2025-07-14 PROCEDURE — 4010F ACE/ARB THERAPY RXD/TAKEN: CPT | Mod: CPTII,S$GLB,, | Performed by: NURSE PRACTITIONER

## 2025-07-14 PROCEDURE — 83036 HEMOGLOBIN GLYCOSYLATED A1C: CPT

## 2025-07-14 PROCEDURE — 36415 COLL VENOUS BLD VENIPUNCTURE: CPT

## 2025-07-14 PROCEDURE — 1159F MED LIST DOCD IN RCRD: CPT | Mod: CPTII,S$GLB,, | Performed by: NURSE PRACTITIONER

## 2025-07-14 PROCEDURE — 85025 COMPLETE CBC W/AUTO DIFF WBC: CPT

## 2025-07-14 PROCEDURE — 3051F HG A1C>EQUAL 7.0%<8.0%: CPT | Mod: CPTII,S$GLB,, | Performed by: NURSE PRACTITIONER

## 2025-07-14 PROCEDURE — 3074F SYST BP LT 130 MM HG: CPT | Mod: CPTII,S$GLB,, | Performed by: NURSE PRACTITIONER

## 2025-07-14 PROCEDURE — 84443 ASSAY THYROID STIM HORMONE: CPT

## 2025-07-14 RX ORDER — ATORVASTATIN CALCIUM 10 MG/1
10 TABLET, FILM COATED ORAL DAILY
Qty: 90 TABLET | Refills: 1 | Status: SHIPPED | OUTPATIENT
Start: 2025-07-14 | End: 2026-07-14

## 2025-07-14 RX ORDER — ONDANSETRON 4 MG/1
4 TABLET, ORALLY DISINTEGRATING ORAL EVERY 8 HOURS PRN
Qty: 20 TABLET | Refills: 0 | Status: SHIPPED | OUTPATIENT
Start: 2025-07-14

## 2025-07-14 RX ORDER — METFORMIN HYDROCHLORIDE 500 MG/1
1000 TABLET, EXTENDED RELEASE ORAL 2 TIMES DAILY WITH MEALS
Qty: 360 TABLET | Refills: 0 | Status: SHIPPED | OUTPATIENT
Start: 2025-07-14 | End: 2026-07-14

## 2025-07-14 RX ORDER — DAPAGLIFLOZIN 10 MG/1
10 TABLET, FILM COATED ORAL DAILY
Qty: 90 TABLET | Refills: 1 | Status: SHIPPED | OUTPATIENT
Start: 2025-07-14 | End: 2025-07-16

## 2025-07-14 RX ORDER — LOSARTAN POTASSIUM 100 MG/1
100 TABLET ORAL DAILY
Qty: 90 TABLET | Refills: 3 | Status: SHIPPED | OUTPATIENT
Start: 2025-07-14

## 2025-07-14 RX ORDER — MECLIZINE HYDROCHLORIDE 25 MG/1
25 TABLET ORAL 3 TIMES DAILY PRN
Qty: 30 TABLET | Refills: 0 | Status: SHIPPED | OUTPATIENT
Start: 2025-07-14

## 2025-07-14 RX ORDER — CITALOPRAM 10 MG/1
10 TABLET ORAL EVERY MORNING
Qty: 90 TABLET | Refills: 3 | Status: SHIPPED | OUTPATIENT
Start: 2025-07-14

## 2025-07-14 RX ORDER — OMEPRAZOLE 40 MG/1
40 CAPSULE, DELAYED RELEASE ORAL DAILY
Qty: 90 CAPSULE | Refills: 0 | Status: SHIPPED | OUTPATIENT
Start: 2025-07-14 | End: 2026-07-14

## 2025-07-14 RX ORDER — ALBUTEROL SULFATE 90 UG/1
2 INHALANT RESPIRATORY (INHALATION) EVERY 6 HOURS PRN
Qty: 18 G | Refills: 5 | Status: SHIPPED | OUTPATIENT
Start: 2025-07-14 | End: 2026-07-14

## 2025-07-14 RX ORDER — MONTELUKAST SODIUM 10 MG/1
10 TABLET ORAL NIGHTLY
Qty: 90 TABLET | Refills: 1 | Status: SHIPPED | OUTPATIENT
Start: 2025-07-14 | End: 2025-08-13

## 2025-07-14 NOTE — TELEPHONE ENCOUNTER
Copied from CRM #[id number]. Topic: Medications - Medication Status Check   >> Jul 14, 2025  4:27 PM Kelsey wrote:  .1MEDICALADVICE     Patient is calling for Medical Advice regarding:patient went to  RX and it is no longer covered by insurance. The generic is also over $700.00. Please call patient to discuss alternatives     How long has patient had these symptoms:    Pharmacy name and phone#:   [facility] - [place] - [address] , [address]  [address] , [address]  [facility] [address]  Phone: [phone] Fax: [phone]        Patient wants a call back or thru [facility], provide patient's call back phone number:[phone]    Comments:    Please advise patient replies from provider may take up to 48 hours.

## 2025-07-14 NOTE — PROGRESS NOTES
INTERNAL MEDICINE PROGRESS/URGENT CARE NOTE    CHIEF COMPLAINT     Chief Complaint   Patient presents with    Annual Exam       HPI     Waleska Weir is a 54 y.o. female who presents for a follow up visit today.    DM-  Recent A1c down to 7.8 from 10.7 previously. Now on metformin to 1 G BID, ozempic 0.5 mg, and farxiga. . She hasn't been checking her sugars. Overdue for eye exam.  He does still get nauseated the 1st day after she takes her Ozempic.  Noticed that her acid reflux has been acting up a bit more.  She denies any abdominal pain or vomiting.    HTN- on losartan 100 mg and toprol 100 mg. Doesn't check her Bps. Asymptomatic.     HLD- on lipitor 10 mg    She was having a lot of wheezing incidentally on exam when I first saw her. Non smoker. CXR was clear. She did not get PFTs done. 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 She does not notice the wheezing. She denies any sob or dyspnea on exertion. States she feels fine. Denies any hx of asthma. She does have eczema on her hands. Recent eosinophil count elevated. Still hasn't gotten PFTs done. Started her on singulair and states she finds this has helped greatly.     Still needs mmg and c-scope. Needs pap.     She was treated for vertigo a few weeks ago at urgent care. Going to ENT. Asking for a refill of meclizine just in case it happens again    Patient Active Problem List   Diagnosis    Condyloma acuminatum of vulva    Hyperlipidemia    Hypertension    Low grade squamous intraepithelial lesion (LGSIL) on cervical Pap smear    Type 2 diabetes mellitus        Past Medical History:  No past medical history on file.     Past Surgical History:  No past surgical history on file.     Allergies:  Review of patient's allergies indicates:  No Known Allergies    Home Medications:    Current Outpatient Medications:     blood sugar diagnostic Strp, To check BG 1 times daily, to use with insurance preferred meter, Disp: 100 strip,  Rfl: 1    blood-glucose meter kit, To check BG 1 times daily, to use with insurance preferred meter, Disp: 1 each, Rfl: 0    lancets Misc, To check BG 1 times daily, to use with insurance preferred meter, Disp: 100 each, Rfl: 1    metoprolol succinate (TOPROL-XL) 100 MG 24 hr tablet, Take 1 tablet (100 mg total) by mouth once daily., Disp: 90 tablet, Rfl: 0    ondansetron (ZOFRAN-ODT) 4 MG TbDL, Take 1 tablet (4 mg total) by mouth every 8 (eight) hours as needed., Disp: 20 tablet, Rfl: 0    semaglutide (OZEMPIC) 0.25 mg or 0.5 mg (2 mg/3 mL) pen injector, Inject 0.5 mg into the skin every 7 days., Disp: 3 each, Rfl: 1    triamcinolone acetonide 0.1% (KENALOG) 0.1 % cream, Apply topically 2 (two) times daily., Disp: 28.4 g, Rfl: 2    albuterol (VENTOLIN HFA) 90 mcg/actuation inhaler, Inhale 2 puffs into the lungs every 6 (six) hours as needed for Wheezing. Rescue, Disp: 18 g, Rfl: 5    atorvastatin (LIPITOR) 10 MG tablet, Take 1 tablet (10 mg total) by mouth once daily., Disp: 90 tablet, Rfl: 1    citalopram (CELEXA) 10 MG tablet, Take 1 tablet (10 mg total) by mouth every morning., Disp: 90 tablet, Rfl: 3    dapagliflozin propanediol (FARXIGA) 10 mg tablet, Take 1 tablet (10 mg total) by mouth once daily., Disp: 90 tablet, Rfl: 1    hydrocortisone 2.5 % cream, Apply topically 2 (two) times daily as needed (rash)., Disp: 28 g, Rfl: 0    losartan (COZAAR) 100 MG tablet, Take 1 tablet (100 mg total) by mouth once daily., Disp: 90 tablet, Rfl: 3    meclizine (ANTIVERT) 25 mg tablet, Take 1 tablet (25 mg total) by mouth 3 (three) times daily as needed for Dizziness or Nausea., Disp: 30 tablet, Rfl: 0    metFORMIN (GLUCOPHAGE-XR) 500 MG ER 24hr tablet, Take 2 tablets (1,000 mg total) by mouth 2 (two) times daily with meals., Disp: 360 tablet, Rfl: 0    montelukast (SINGULAIR) 10 mg tablet, Take 1 tablet (10 mg total) by mouth every evening., Disp: 90 tablet, Rfl: 1    omeprazole (PRILOSEC) 40 MG capsule, Take 1 capsule  "(40 mg total) by mouth once daily., Disp: 90 capsule, Rfl: 0    ondansetron (ZOFRAN-ODT) 4 MG TbDL, Take 1 tablet (4 mg total) by mouth every 8 (eight) hours as needed., Disp: 20 tablet, Rfl: 0     Review of Systems:  Review of Systems   Constitutional:  Negative for fatigue and fever.   HENT:  Negative for congestion and sore throat.    Respiratory:  Negative for cough, chest tightness and shortness of breath.    Cardiovascular:  Negative for chest pain.   Gastrointestinal:  Positive for nausea. Negative for abdominal pain, constipation, diarrhea and vomiting.   Genitourinary:  Negative for dysuria and urgency.   Musculoskeletal:  Negative for arthralgias.   Neurological:  Negative for weakness and headaches.         PHYSICAL EXAM     Vitals:    07/14/25 1500   BP: 118/78   BP Location: Right arm   Patient Position: Sitting   Pulse: 91   SpO2: 96%   Weight: 82.5 kg (181 lb 14.1 oz)   Height: 5' 3" (1.6 m)        Body mass index is 32.22 kg/m².     Physical Exam  Vitals reviewed.   Constitutional:       Appearance: Normal appearance.   HENT:      Head: Normocephalic.      Right Ear: Tympanic membrane normal.      Left Ear: Tympanic membrane normal.      Mouth/Throat:      Mouth: Mucous membranes are moist.      Pharynx: Oropharynx is clear.   Eyes:      Conjunctiva/sclera: Conjunctivae normal.      Pupils: Pupils are equal, round, and reactive to light.   Cardiovascular:      Rate and Rhythm: Normal rate and regular rhythm.      Pulses:           Dorsalis pedis pulses are 2+ on the right side and 2+ on the left side.        Posterior tibial pulses are 2+ on the right side and 2+ on the left side.   Pulmonary:      Effort: Pulmonary effort is normal.      Breath sounds: No wheezing, rhonchi or rales.   Abdominal:      General: Bowel sounds are normal.      Palpations: Abdomen is soft.      Tenderness: There is no abdominal tenderness.   Musculoskeletal:      Right lower leg: No edema.      Left lower leg: No edema. "   Feet:      Right foot:      Protective Sensation: 8 sites tested.  8 sites sensed.      Skin integrity: No ulcer, blister, skin breakdown or fissure.      Left foot:      Protective Sensation: 8 sites tested.  8 sites sensed.      Skin integrity: No ulcer, blister, skin breakdown or fissure.   Lymphadenopathy:      Cervical: No cervical adenopathy.   Skin:     General: Skin is warm and dry.   Neurological:      Mental Status: She is alert and oriented to person, place, and time.   Psychiatric:         Mood and Affect: Mood normal.         Behavior: Behavior normal.         LABS     Lab Results   Component Value Date    HGBA1C 7.8 (H) 01/13/2025     CMP  Sodium   Date Value Ref Range Status   01/13/2025 137 136 - 145 mmol/L Final     Potassium   Date Value Ref Range Status   01/13/2025 4.0 3.5 - 5.1 mmol/L Final     Chloride   Date Value Ref Range Status   01/13/2025 105 95 - 110 mmol/L Final     CO2   Date Value Ref Range Status   01/13/2025 20 (L) 23 - 29 mmol/L Final     Glucose   Date Value Ref Range Status   01/13/2025 129 (H) 70 - 110 mg/dL Final     BUN   Date Value Ref Range Status   01/13/2025 19 6 - 20 mg/dL Final     Creatinine   Date Value Ref Range Status   01/13/2025 0.6 0.5 - 1.4 mg/dL Final     Calcium   Date Value Ref Range Status   01/13/2025 9.4 8.7 - 10.5 mg/dL Final     Total Protein   Date Value Ref Range Status   01/13/2025 7.4 6.0 - 8.4 g/dL Final     Albumin   Date Value Ref Range Status   01/13/2025 3.7 3.5 - 5.2 g/dL Final     Total Bilirubin   Date Value Ref Range Status   01/13/2025 0.3 0.1 - 1.0 mg/dL Final     Comment:     For infants and newborns, interpretation of results should be based  on gestational age, weight and in agreement with clinical  observations.    Premature Infant recommended reference ranges:  Up to 24 hours.............<8.0 mg/dL  Up to 48 hours............<12.0 mg/dL  3-5 days..................<15.0 mg/dL  6-29 days.................<15.0 mg/dL       Alkaline  Phosphatase   Date Value Ref Range Status   01/13/2025 96 40 - 150 U/L Final     AST   Date Value Ref Range Status   01/13/2025 16 10 - 40 U/L Final     ALT   Date Value Ref Range Status   01/13/2025 10 10 - 44 U/L Final     Anion Gap   Date Value Ref Range Status   01/13/2025 12 8 - 16 mmol/L Final     Lab Results   Component Value Date    WBC 5.44 09/23/2024    HGB 13.9 09/23/2024    HCT 43.6 09/23/2024    MCV 88 09/23/2024     09/23/2024     Lab Results   Component Value Date    CHOL 150 01/13/2025    CHOL 219 (H) 09/23/2024    CHOL 219 (H) 09/23/2024     Lab Results   Component Value Date    HDL 43 01/13/2025    HDL 46 09/23/2024    HDL 46 09/23/2024     Lab Results   Component Value Date    LDLCALC 81.8 01/13/2025    LDLCALC 139.0 09/23/2024    LDLCALC 139.0 09/23/2024     Lab Results   Component Value Date    TRIG 126 01/13/2025    TRIG 170 (H) 09/23/2024    TRIG 170 (H) 09/23/2024     Lab Results   Component Value Date    CHOLHDL 28.7 01/13/2025    CHOLHDL 21.0 09/23/2024    CHOLHDL 21.0 09/23/2024     Lab Results   Component Value Date    TSH 2.518 09/23/2024       ASSESSMENT     1. Type 2 diabetes mellitus with other specified complication, without long-term current use of insulin    2. Hypertension, unspecified type    3. Hyperlipidemia, unspecified hyperlipidemia type    4. Colon cancer screening    5. Dizziness    6. Gastroesophageal reflux disease, unspecified whether esophagitis present    7. Type 2 diabetes mellitus without complication, without long-term current use of insulin    8. Primary hypertension    9. Anxiety    10. Wheezing    11. Cervical cancer screening           PLAN  1. Type 2 diabetes mellitus with other specified complication, without long-term current use of insulin  Recheck A1c today.  Continue current meds for now.  Discussed a possible switch to Mounjaro to see if that helps more with side effects though discussed it may not change.  If her A1c still not at goal consider  switching to Mounjaro.  PCP ordered I camera she will do that today.  -     Comprehensive Metabolic Panel; Future; Expected date: 07/14/2025  -     CBC Auto Differential; Future; Expected date: 07/14/2025  -     Hemoglobin A1C; Future; Expected date: 07/14/2025  -     TSH; Future; Expected date: 07/14/2025    2. Hypertension, unspecified type  Stable. Continue current treatment.   -     Comprehensive Metabolic Panel; Future; Expected date: 07/14/2025  -     CBC Auto Differential; Future; Expected date: 07/14/2025  -     Hemoglobin A1C; Future; Expected date: 07/14/2025  -     TSH; Future; Expected date: 07/14/2025    3. Hyperlipidemia, unspecified hyperlipidemia type  Stable. Continue current treatment.    -     Comprehensive Metabolic Panel; Future; Expected date: 07/14/2025  -     CBC Auto Differential; Future; Expected date: 07/14/2025  -     Hemoglobin A1C; Future; Expected date: 07/14/2025  -     TSH; Future; Expected date: 07/14/2025  -     atorvastatin (LIPITOR) 10 MG tablet; Take 1 tablet (10 mg total) by mouth once daily.  Dispense: 90 tablet; Refill: 1    4. Colon cancer screening  Has been very busy with work and unable to get preventative screenings done.  She is amenable to fit kit  -     Fecal Immunochemical Test (iFOBT); Future; Expected date: 07/14/2025    5. Dizziness  Resolved.  We will refill her meclizine p.r.n. follow up with ENT  -     meclizine (ANTIVERT) 25 mg tablet; Take 1 tablet (25 mg total) by mouth 3 (three) times daily as needed for Dizziness or Nausea.  Dispense: 30 tablet; Refill: 0    6. Gastroesophageal reflux disease, unspecified whether esophagitis present  Trial Prilosec.  Zofran p.r.n. nausea.  This is all likely secondary to Ozempic though.  -     omeprazole (PRILOSEC) 40 MG capsule; Take 1 capsule (40 mg total) by mouth once daily.  Dispense: 90 capsule; Refill: 0  -     ondansetron (ZOFRAN-ODT) 4 MG TbDL; Take 1 tablet (4 mg total) by mouth every 8 (eight) hours as needed.   Dispense: 20 tablet; Refill: 0    7. Type 2 diabetes mellitus without complication, without long-term current use of insulin  Recheck A1c today.  See above.  Consider switching GLP 1 to majora.  Eye exam today  -     dapagliflozin propanediol (FARXIGA) 10 mg tablet; Take 1 tablet (10 mg total) by mouth once daily.  Dispense: 90 tablet; Refill: 1  -     metFORMIN (GLUCOPHAGE-XR) 500 MG ER 24hr tablet; Take 2 tablets (1,000 mg total) by mouth 2 (two) times daily with meals.  Dispense: 360 tablet; Refill: 0    8. Primary hypertension  Stable continue current treatment  Comments:  on losartan 100 mg, metoprolol succinate 100 mg  Orders:  -     losartan (COZAAR) 100 MG tablet; Take 1 tablet (100 mg total) by mouth once daily.  Dispense: 90 tablet; Refill: 3    9. Anxiety  Mood has been stable on Celexa 10 mg.  Continue  Comments:  medications reviewed, prescribed citalopram 10 mg daily  Orders:  -     citalopram (CELEXA) 10 MG tablet; Take 1 tablet (10 mg total) by mouth every morning.  Dispense: 90 tablet; Refill: 3    10. Wheezing  Much improved on Singulair continue.  Refilled albuterol as needed  -     montelukast (SINGULAIR) 10 mg tablet; Take 1 tablet (10 mg total) by mouth every evening.  Dispense: 90 tablet; Refill: 1  -     albuterol (VENTOLIN HFA) 90 mcg/actuation inhaler; Inhale 2 puffs into the lungs every 6 (six) hours as needed for Wheezing. Rescue  Dispense: 18 g; Refill: 5    11. Cervical cancer screening  -     Ambulatory referral/consult to Obstetrics / Gynecology; Future; Expected date: 07/21/2025       Follow up in 3 months with Dr. Peña. Instructed on importance of seeing PCP at least once a year.    Visit today included increased complexity associated with the care of the episodic problem addressed and managing the longitudinal care of the patient due to the serious and/or complex managed problem(s).      Patient's plan/treatment was discussed including medications and possible side effects.  Verbalized understanding of all instructions.     This note was partly generated with [employer] voice recognition software. I apologize for any possible typographical errors.          ERNIE Bazan  Department of Internal Medicine - [facility]  07/14/2025

## 2025-07-15 ENCOUNTER — TELEPHONE (OUTPATIENT)
Dept: INTERNAL MEDICINE | Facility: CLINIC | Age: 55
End: 2025-07-15
Payer: COMMERCIAL

## 2025-07-15 DIAGNOSIS — E11.69 TYPE 2 DIABETES MELLITUS WITH OTHER SPECIFIED COMPLICATION, WITHOUT LONG-TERM CURRENT USE OF INSULIN: Primary | ICD-10-CM

## 2025-07-15 DIAGNOSIS — I10 PRIMARY HYPERTENSION: ICD-10-CM

## 2025-07-15 LAB
ALBUMIN SERPL BCP-MCNC: 3.9 G/DL (ref 3.5–5.2)
ALP SERPL-CCNC: 94 UNIT/L (ref 40–150)
ALT SERPL W/O P-5'-P-CCNC: 17 UNIT/L (ref 10–44)
ANION GAP (OHS): 12 MMOL/L (ref 8–16)
AST SERPL-CCNC: 19 UNIT/L (ref 11–45)
BILIRUB SERPL-MCNC: 0.3 MG/DL (ref 0.1–1)
BUN SERPL-MCNC: 16 MG/DL (ref 6–20)
CALCIUM SERPL-MCNC: 9.6 MG/DL (ref 8.7–10.5)
CHLORIDE SERPL-SCNC: 107 MMOL/L (ref 95–110)
CO2 SERPL-SCNC: 21 MMOL/L (ref 23–29)
CREAT SERPL-MCNC: 0.6 MG/DL (ref 0.5–1.4)
EAG (OHS): 189 MG/DL (ref 68–131)
GFR SERPLBLD CREATININE-BSD FMLA CKD-EPI: >60 ML/MIN/1.73/M2
GLUCOSE SERPL-MCNC: 199 MG/DL (ref 70–110)
HBA1C MFR BLD: 8.2 % (ref 4–5.6)
POTASSIUM SERPL-SCNC: 4.4 MMOL/L (ref 3.5–5.1)
PROT SERPL-MCNC: 7.1 GM/DL (ref 6–8.4)
SODIUM SERPL-SCNC: 140 MMOL/L (ref 136–145)
TSH SERPL-ACNC: 1.64 UIU/ML (ref 0.4–4)

## 2025-07-15 NOTE — TELEPHONE ENCOUNTER
Spoke with the pt and pt stated that Farxiga rx cost her 700 $ .  Pharmacy  refill for her for 30 days for this month only  45 $ co-pay same cost will be for generic as well.   Pt may need to switch with  compatible meds which will cover.  Pt also need a Rx for Metoprolol .     Please advise.

## 2025-07-15 NOTE — TELEPHONE ENCOUNTER
Copied from CRM #[id number]. Topic: General Inquiry - Patient Advice  >> Jul 15, 2025  2:52 PM Caroline wrote:  Patient is returning a phone call.    Who left a message for the patient: Nela Harmon MA    Does patient know what this is regarding:      Would you like a call back, or a response through your [facility] portal?:   call     Best call back number:     Comments:

## 2025-07-16 ENCOUNTER — TELEPHONE (OUTPATIENT)
Dept: INTERNAL MEDICINE | Facility: CLINIC | Age: 55
End: 2025-07-16
Payer: COMMERCIAL

## 2025-07-16 RX ORDER — GLIPIZIDE 5 MG/1
5 TABLET, FILM COATED, EXTENDED RELEASE ORAL
Qty: 90 TABLET | Refills: 3 | Status: SHIPPED | OUTPATIENT
Start: 2025-07-16 | End: 2026-07-16

## 2025-07-16 RX ORDER — METOPROLOL SUCCINATE 100 MG/1
100 TABLET, EXTENDED RELEASE ORAL DAILY
Qty: 90 TABLET | Refills: 1 | Status: SHIPPED | OUTPATIENT
Start: 2025-07-16

## 2025-07-16 RX ORDER — TIRZEPATIDE 2.5 MG/.5ML
2.5 INJECTION, SOLUTION SUBCUTANEOUS
Qty: 2 ML | Refills: 0 | Status: SHIPPED | OUTPATIENT
Start: 2025-07-16

## 2025-07-16 NOTE — TELEPHONE ENCOUNTER
Pt called back to discuss the pt being switched over on a few of her medications she stated that she understood and would start everything upon finishing the old medication

## 2025-07-16 NOTE — TELEPHONE ENCOUNTER
Pt was called to discuss the medication the provider has switched her to, I was sent to  and left a message for her to call back

## 2025-07-16 NOTE — TELEPHONE ENCOUNTER
Please call and let pt know I stopped farxiga since too expensive. Start glipizide daily instead.   I've also switched her from ozempic to mounjaro. A1c still too high and I think the mounjaro would help with this a bit more. Okay to take last pen of ozempic. Start mounjaro 1 week from last ozempic injection. Rest of her labs were stable.

## 2025-08-14 ENCOUNTER — PATIENT OUTREACH (OUTPATIENT)
Dept: ADMINISTRATIVE | Facility: HOSPITAL | Age: 55
End: 2025-08-14
Payer: COMMERCIAL

## 2025-08-14 DIAGNOSIS — Z12.4 ENCOUNTER FOR SCREENING FOR CERVICAL CANCER: ICD-10-CM

## 2025-08-14 DIAGNOSIS — E11.69 TYPE 2 DIABETES MELLITUS WITH OTHER SPECIFIED COMPLICATION, WITHOUT LONG-TERM CURRENT USE OF INSULIN: Primary | ICD-10-CM

## 2025-08-18 DIAGNOSIS — E11.69 TYPE 2 DIABETES MELLITUS WITH OTHER SPECIFIED COMPLICATION, WITHOUT LONG-TERM CURRENT USE OF INSULIN: ICD-10-CM

## 2025-08-18 RX ORDER — TIRZEPATIDE 2.5 MG/.5ML
2.5 INJECTION, SOLUTION SUBCUTANEOUS
Qty: 2 ML | Refills: 0 | Status: CANCELLED | OUTPATIENT
Start: 2025-08-18

## 2025-08-19 RX ORDER — TIRZEPATIDE 5 MG/.5ML
5 INJECTION, SOLUTION SUBCUTANEOUS
Qty: 2 ML | Refills: 1 | Status: SHIPPED | OUTPATIENT
Start: 2025-08-19